# Patient Record
Sex: FEMALE | Race: WHITE | Employment: OTHER | URBAN - METROPOLITAN AREA
[De-identification: names, ages, dates, MRNs, and addresses within clinical notes are randomized per-mention and may not be internally consistent; named-entity substitution may affect disease eponyms.]

---

## 2021-05-29 ENCOUNTER — RECORDS - HEALTHEAST (OUTPATIENT)
Dept: ADMINISTRATIVE | Facility: CLINIC | Age: 86
End: 2021-05-29

## 2021-10-01 ENCOUNTER — TRANSITIONAL CARE UNIT VISIT (OUTPATIENT)
Dept: GERIATRICS | Facility: CLINIC | Age: 86
End: 2021-10-01
Payer: COMMERCIAL

## 2021-10-01 ENCOUNTER — TELEPHONE (OUTPATIENT)
Dept: GERIATRICS | Facility: CLINIC | Age: 86
End: 2021-10-01

## 2021-10-01 VITALS
SYSTOLIC BLOOD PRESSURE: 152 MMHG | HEIGHT: 56 IN | BODY MASS INDEX: 21.15 KG/M2 | HEART RATE: 97 BPM | WEIGHT: 94 LBS | RESPIRATION RATE: 18 BRPM | OXYGEN SATURATION: 94 % | DIASTOLIC BLOOD PRESSURE: 82 MMHG | TEMPERATURE: 97.5 F

## 2021-10-01 DIAGNOSIS — R11.0 NAUSEA: ICD-10-CM

## 2021-10-01 DIAGNOSIS — J43.1 PANLOBULAR EMPHYSEMA (H): ICD-10-CM

## 2021-10-01 DIAGNOSIS — F32.A MILD DEPRESSION: ICD-10-CM

## 2021-10-01 DIAGNOSIS — N18.30 STAGE 3 CHRONIC KIDNEY DISEASE, UNSPECIFIED WHETHER STAGE 3A OR 3B CKD (H): ICD-10-CM

## 2021-10-01 DIAGNOSIS — D64.9 ANEMIA, UNSPECIFIED TYPE: ICD-10-CM

## 2021-10-01 DIAGNOSIS — Z93.2 ILEOSTOMY IN PLACE (H): ICD-10-CM

## 2021-10-01 DIAGNOSIS — K65.1 INTRA-ABDOMINAL ABSCESS (H): Primary | ICD-10-CM

## 2021-10-01 PROBLEM — J96.11 CHRONIC RESPIRATORY FAILURE WITH HYPOXIA (H): Status: ACTIVE | Noted: 2020-01-03

## 2021-10-01 PROCEDURE — 99309 SBSQ NF CARE MODERATE MDM 30: CPT | Performed by: NURSE PRACTITIONER

## 2021-10-01 RX ORDER — SERTRALINE HYDROCHLORIDE 100 MG/1
150 TABLET, FILM COATED ORAL DAILY
COMMUNITY

## 2021-10-01 RX ORDER — MULTIVIT-MIN/IRON/FOLIC ACID/K 18-600-40
2 CAPSULE ORAL DAILY
COMMUNITY

## 2021-10-01 RX ORDER — TRAMADOL HYDROCHLORIDE 50 MG/1
50 TABLET ORAL 4 TIMES DAILY PRN
COMMUNITY
End: 2021-10-08 | Stop reason: ALTCHOICE

## 2021-10-01 RX ORDER — CLOBETASOL PROPIONATE 0.5 MG/G
CREAM TOPICAL 2 TIMES DAILY
COMMUNITY

## 2021-10-01 RX ORDER — LOSARTAN POTASSIUM 50 MG/1
50 TABLET ORAL DAILY
COMMUNITY

## 2021-10-01 RX ORDER — ROPINIROLE 0.5 MG/1
0.5 TABLET, FILM COATED ORAL AT BEDTIME
COMMUNITY

## 2021-10-01 RX ORDER — ONDANSETRON 4 MG/1
4 TABLET, FILM COATED ORAL EVERY 8 HOURS PRN
Qty: 30 TABLET | Refills: 0 | Status: SHIPPED
Start: 2021-10-01

## 2021-10-01 RX ORDER — TRIAMCINOLONE ACETONIDE 1 MG/G
CREAM TOPICAL 2 TIMES DAILY PRN
COMMUNITY

## 2021-10-01 RX ORDER — CEFUROXIME AXETIL 250 MG/1
250 TABLET ORAL 2 TIMES DAILY
COMMUNITY
End: 2021-10-16

## 2021-10-01 RX ORDER — IPRATROPIUM BROMIDE AND ALBUTEROL SULFATE 2.5; .5 MG/3ML; MG/3ML
1 SOLUTION RESPIRATORY (INHALATION) EVERY 6 HOURS PRN
COMMUNITY

## 2021-10-01 RX ORDER — AMLODIPINE BESYLATE 2.5 MG/1
5 TABLET ORAL DAILY
COMMUNITY

## 2021-10-01 RX ORDER — ACETAMINOPHEN 500 MG
1000 TABLET ORAL 3 TIMES DAILY
Qty: 90 TABLET | Refills: 0
Start: 2021-10-01

## 2021-10-01 RX ORDER — LANOLIN ALCOHOL/MO/W.PET/CERES
1000 CREAM (GRAM) TOPICAL DAILY
COMMUNITY

## 2021-10-01 RX ORDER — ALBUTEROL SULFATE 90 UG/1
2 AEROSOL, METERED RESPIRATORY (INHALATION) EVERY 4 HOURS PRN
COMMUNITY

## 2021-10-01 RX ORDER — METRONIDAZOLE 250 MG/1
250 TABLET ORAL 2 TIMES DAILY
COMMUNITY
Start: 2021-10-01 | End: 2021-10-12

## 2021-10-01 ASSESSMENT — MIFFLIN-ST. JEOR: SCORE: 684.38

## 2021-10-01 NOTE — PROGRESS NOTES
[facility] GERIATRIC SERVICES  PRIMARY CARE PROVIDER AND CLINIC:  No primary care provider on file., No primary physician on file.  Chief Complaint   Patient presents with     Hospital F/U      [place] [facility] Record Number:  [id number]  Place of Service where encounter took place:  [facility] ([facility]) [[id number]]    Kelly Gilmore  is a 89 year old  (2/2/1927), admitted to the above facility from  [facility] . Hospital stay 9/20/21 through 9/29/21..   HPI:    Ms. Kelly Gilmore is a 89 y.o. female with a history of emphysema, ulcerative colitis, colectomy, ileostomy since 1990s who presented with abdominal pain, back pain, nausea, also ostomy also occasionally prolapsed, CT with abscess near ostomy, measures 3.7x3.6cm, started on IV Zosyn, the abscess was too small to drain, no indication for surgery, improving with ABX, having new shortness of breath with increased O2 requirements with at, electasis and fluid overload, patient was diuresed and CT PE done to rule out PE, O2 sats stable between 88-92%, discharged to TCU with 2 weeks of cefuroxime and metronidazole. She is to follow-up with Colorectal Surgery for repeat CT scan on 10/13/21.    Labs in hospital Na 127-138, K 3-4.3, H 7.6-8.4    Patient today oriented, lives in own home, fall in TCU on 9/30 no injury, /90, SLUMS 22/30, mild cognitive impairment, no distress, mild pain, also nausea probably from oral ABX, no distress.      CODE STATUS/ADVANCE DIRECTIVES DISCUSSION:  No Order  DNR only  ALLERGIES: No Known Allergies   PAST MEDICAL HISTORY: No past medical history on file.   PAST SURGICAL HISTORY:   has no past surgical history on file.  FAMILY HISTORY: family history is not on file.  SOCIAL HISTORY:     Patient's living condition: lives alone    Post Discharge Medication Reconciliation Status: discharge medications reconciled and changed, per note/orders  Current Outpatient Medications   Medication Sig     acetaminophen  "(TYLENOL) 500 MG tablet Take 2 tablets (1,000 mg) by mouth 3 times daily     albuterol (PROAIR HFA/PROVENTIL HFA/VENTOLIN HFA) 108 (90 Base) MCG/ACT inhaler Inhale 2 puffs into the lungs every 4 hours as needed for shortness of breath / dyspnea or wheezing     amLODIPine (NORVASC) 2.5 MG tablet Take 5 mg by mouth daily     cefuroxime (CEFTIN) 250 MG tablet Take 250 mg by mouth 2 times daily     clobetasol (TEMOVATE) 0.05 % CREA cream Apply topically 2 times daily     cyanocobalamin (VITAMIN B-12) 1000 MCG tablet Take 1,000 mcg by mouth daily     Fluticasone-Umeclidin-Vilanterol (TRELEGY ELLIPTA) 100-62.5-25 MCG/INH oral inhaler Inhale 1 puff into the lungs daily     ipratropium - albuterol 0.5 mg/2.5 mg/3 mL (DUONEB) 0.5-2.5 (3) MG/3ML neb solution Take 1 vial by nebulization every 6 hours as needed for shortness of breath / dyspnea or wheezing     losartan (COZAAR) 50 MG tablet Take 50 mg by mouth daily     metroNIDAZOLE (FLAGYL) 250 MG tablet Take 250 mg by mouth 2 times daily     MULTIPLE VITAMIN PO Take 1 tablet by mouth daily     ondansetron (ZOFRAN) 4 MG tablet Take 1 tablet (4 mg) by mouth every 8 hours as needed for nausea     rOPINIRole (REQUIP) 0.5 MG tablet Take 0.5 mg by mouth At Bedtime     sertraline (ZOLOFT) 100 MG tablet Take 150 mg by mouth daily     traMADol (ULTRAM) 50 MG tablet Take 50 mg by mouth 4 times daily as needed for severe pain     triamcinolone (KENALOG) 0.1 % external cream Apply topically 2 times daily as needed for irritation     Vitamin D, Cholecalciferol, 25 MCG (1000 UT) TABS Take 2 tablets by mouth daily     No current facility-administered medications for this visit.       ROS:  4 point ROS including Respiratory, CV, GI and , other than that noted in the HPI,  is negative    Vitals:  BP (!) 152/82   Pulse 97   Temp 97.5  F (36.4  C)   Resp 18   Ht 1.422 m (4' 8\")   Wt 42.6 kg (94 lb)   SpO2 94%   BMI 21.07 kg/m    Exam:  GENERAL APPEARANCE:  in no distress, appears " healthy, thin  ENT:  Mouth and posterior oropharynx normal, moist mucous membranes  RESP:  lungs clear to auscultation   CV:  no edema, rate-normal  ABDOMEN:  bowel sounds normal  M/S:   Gait and station abnormal transfer assist  SKIN:  Inspection of skin and subcutaneous tissue baseline  NEURO:   Examination of sensation by touch normal  PSYCH:  oriented X 3, memory impaired     Lab/Diagnostic data:  Recent labs in [facility] reviewed by me today.     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)  (R11.0) Nausea  Comment: ANW 9/20-29, no surgical intervention  Plan:  -continue ceftin and flagyl x14 days  -follow up ANW CT scan on 10/13  -PT/OT to eval and treat  -change tramadol to 50mg QID PRN  -add APAP 1000mg TID scheduled  -increase amlodipine from 2.5 to 5mg   -CBC, BMP on 10/4  -start zofran 4mg TID PRN  -plan to follow up early next week RE: status and plan       (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)  Comment: no cough, mild dimness R base  Plan: continue current pulmonary regimen    (N18.30) Stage 3 chronic kidney disease, unspecified whether stage 3a or 3b CKD (H)  Comment: creat in hospital in the 0.8-0.9 range  Plan: dose medication renally as possible  -BMP on 10/4    (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

## 2021-10-02 NOTE — TELEPHONE ENCOUNTER
Resident with new onset of acute moderate hyponatremia. Sodium level 127 today. Per nursing staff, CNP was notified today at 1:00 pm and no orders were given. Resident remains asymptomatic.     Plan:  1. Sodium level tomorrow   2. Call if becomes symptomatic.     SWETA Nieves [facility] Geriatric Services

## 2021-10-04 ENCOUNTER — TRANSITIONAL CARE UNIT VISIT (OUTPATIENT)
Dept: GERIATRICS | Facility: CLINIC | Age: 86
End: 2021-10-04
Payer: COMMERCIAL

## 2021-10-04 VITALS
DIASTOLIC BLOOD PRESSURE: 80 MMHG | HEIGHT: 56 IN | RESPIRATION RATE: 18 BRPM | SYSTOLIC BLOOD PRESSURE: 137 MMHG | OXYGEN SATURATION: 95 % | HEART RATE: 82 BPM | TEMPERATURE: 97.5 F | WEIGHT: 87 LBS | BODY MASS INDEX: 19.57 KG/M2

## 2021-10-04 DIAGNOSIS — E87.1 HYPONATREMIA: ICD-10-CM

## 2021-10-04 DIAGNOSIS — K65.1 INTRA-ABDOMINAL ABSCESS (H): Primary | ICD-10-CM

## 2021-10-04 DIAGNOSIS — E87.6 HYPOKALEMIA: ICD-10-CM

## 2021-10-04 PROCEDURE — 99309 SBSQ NF CARE MODERATE MDM 30: CPT | Performed by: NURSE PRACTITIONER

## 2021-10-04 RX ORDER — POTASSIUM CHLORIDE 1500 MG/1
20 TABLET, EXTENDED RELEASE ORAL DAILY
Qty: 30 TABLET | Refills: 0
Start: 2021-10-04

## 2021-10-04 RX ORDER — OXYCODONE HYDROCHLORIDE 5 MG/1
2.5 TABLET ORAL EVERY 4 HOURS PRN
Qty: 12 TABLET | Refills: 0
Start: 2021-10-04 | End: 2021-10-08

## 2021-10-04 ASSESSMENT — MIFFLIN-ST. JEOR: SCORE: 652.63

## 2021-10-04 NOTE — PROGRESS NOTES
"[facility] GERIATRIC SERVICES    Chief Complaint   Patient presents with     Nursing Home Acute     HPI:  Kelly Gilmore is a 89 year old  (2/2/1927), who is being seen today for an episodic care visit at: [facility] ([facility]) [[id number]]. Today's concern is:   1. Intra-abdominal abscess (H)    2. Hypokalemia    3. Hyponatremia      Patient seen today for follow up, resting in bed, weak, deconditioned, NA's trending in the 126-127 range, K today 3.3, having some abdominal discomfort, states the tramadol dose not work and requesting oxycodone that she had in hospital, ostomy appliance in place, output WNL, SLUMS 22/30, no distress.    Allergies, and PMH/PSH reviewed in EPIC today.  REVIEW OF SYSTEMS:  4 point ROS including Respiratory, CV, GI and , other than that noted in the HPI,  is negative    Objective:   /80   Pulse 82   Temp 97.5  F (36.4  C)   Resp 18   Ht 1.422 m (4' 8\")   Wt 39.5 kg (87 lb)   SpO2 95%   BMI 19.51 kg/m    GENERAL APPEARANCE:  in no distress, thin  ENT:  Mouth and posterior oropharynx normal, moist mucous membranes  RESP:  lungs clear to auscultation   CV:  no edema  ABDOMEN:  bowel sounds normal  M/S:   Gait and station abnormal transfer assist  SKIN:  Inspection of skin and subcutaneous tissue baseline  NEURO:   Examination of sensation by touch normal  PSYCH:  oriented X 3, memory impaired     [facility]. Please refer to them using [facility]/Care Everywhere.    Assessment/Plan:  (K65.1) Intra-abdominal abscess (H)  (primary encounter diagnosis)  Comment: mild gut pain, no distress, VS WNL  Plan: add oxycodone 2.5mg Q4h PRN  -continue tramadol PRN for now  -plan to discontinue tramadol if opioid is effective  -follow up CT on 10/13    (E87.6) Hypokalemia  Comment: K today 3.3  Plan: start K 20mEq  -BMP on 10/11    (E87.1) Hyponatremia  Comment: Na today 126, asymptomatic  Plan: recheck Na on 10/11  -NaCl tabs 1g BID      Electronically signed by: " Laurent Mcginnis, SWETA CNP

## 2021-10-04 NOTE — LETTER
"    10/4/2021        RE: Kelly Gilmore  [address]  [facility] [address]        [facility]    Chief Complaint   Patient presents with     Nursing Home Acute     HPI:  Kelly Gilmore is a 89 year old  (2/2/1927), who is being seen today for an episodic care visit at: [facility] ([facility]) [[id number]]. Today's concern is:   1. Intra-abdominal abscess (H)    2. Hypokalemia    3. Hyponatremia      Patient seen today for follow up, resting in bed, weak, deconditioned, NA's trending in the 126-127 range, K today 3.3, having some abdominal discomfort, states the tramadol dose not work and requesting oxycodone that she had in hospital, ostomy appliance in place, output WNL, SLUMS 22/30, no distress.    Allergies, and PMH/PSH reviewed in EPIC today.  REVIEW OF SYSTEMS:  4 point ROS including Respiratory, CV, GI and , other than that noted in the HPI,  is negative    Objective:   /80   Pulse 82   Temp 97.5  F (36.4  C)   Resp 18   Ht 1.422 m (4' 8\")   Wt 39.5 kg (87 lb)   SpO2 95%   BMI 19.51 kg/m    GENERAL APPEARANCE:  in no distress, thin  ENT:  Mouth and posterior oropharynx normal, moist mucous membranes  RESP:  lungs clear to auscultation   CV:  no edema  ABDOMEN:  bowel sounds normal  M/S:   Gait and station abnormal transfer assist  SKIN:  Inspection of skin and subcutaneous tissue baseline  NEURO:   Examination of sensation by touch normal  PSYCH:  oriented X 3, memory impaired     Labs [facility] SNF [facility]. Please refer to them using [facility]/Care Everywhere.    Assessment/Plan:  (K65.1) Intra-abdominal abscess (H)  (primary encounter diagnosis)  Comment: mild gut pain, no distress, VS WNL  Plan: add oxycodone 2.5mg Q4h PRN  -continue tramadol PRN for now  -plan to discontinue tramadol if opioid is effective  -follow up CT on 10/13    (E87.6) Hypokalemia  Comment: K today 3.3  Plan: start K 20mEq  -BMP on 10/11    (E87.1) Hyponatremia  Comment: Na today 126, " asymptomatic  Plan: recheck Na on 10/11  -NaCl tabs 1g BID      Electronically signed by: SWETA Yousif CNP             Sincerely,        SWETA Yousif CNP

## 2021-10-07 ENCOUNTER — NURSING HOME VISIT (OUTPATIENT)
Dept: GERIATRICS | Facility: CLINIC | Age: 86
End: 2021-10-07
Payer: COMMERCIAL

## 2021-10-07 DIAGNOSIS — R11.0 NAUSEA: ICD-10-CM

## 2021-10-07 DIAGNOSIS — N18.30 STAGE 3 CHRONIC KIDNEY DISEASE, UNSPECIFIED WHETHER STAGE 3A OR 3B CKD (H): ICD-10-CM

## 2021-10-07 DIAGNOSIS — D64.9 ANEMIA, UNSPECIFIED TYPE: ICD-10-CM

## 2021-10-07 DIAGNOSIS — J43.1 PANLOBULAR EMPHYSEMA (H): ICD-10-CM

## 2021-10-07 DIAGNOSIS — K65.1 INTRA-ABDOMINAL ABSCESS (H): Primary | ICD-10-CM

## 2021-10-07 DIAGNOSIS — E87.1 HYPONATREMIA: ICD-10-CM

## 2021-10-07 PROCEDURE — 99304 1ST NF CARE SF/LOW MDM 25: CPT | Performed by: INTERNAL MEDICINE

## 2021-10-08 ENCOUNTER — TRANSITIONAL CARE UNIT VISIT (OUTPATIENT)
Dept: GERIATRICS | Facility: CLINIC | Age: 86
End: 2021-10-08
Payer: COMMERCIAL

## 2021-10-08 VITALS
RESPIRATION RATE: 18 BRPM | SYSTOLIC BLOOD PRESSURE: 102 MMHG | DIASTOLIC BLOOD PRESSURE: 65 MMHG | HEIGHT: 56 IN | TEMPERATURE: 97.5 F | WEIGHT: 85 LBS | BODY MASS INDEX: 19.12 KG/M2 | OXYGEN SATURATION: 97 % | HEART RATE: 94 BPM

## 2021-10-08 DIAGNOSIS — E44.0 MODERATE PROTEIN-CALORIE MALNUTRITION (H): Primary | ICD-10-CM

## 2021-10-08 DIAGNOSIS — R10.84 ABDOMINAL PAIN, GENERALIZED: ICD-10-CM

## 2021-10-08 DIAGNOSIS — K65.1 INTRA-ABDOMINAL ABSCESS (H): ICD-10-CM

## 2021-10-08 PROCEDURE — 99309 SBSQ NF CARE MODERATE MDM 30: CPT | Performed by: NURSE PRACTITIONER

## 2021-10-08 RX ORDER — OXYCODONE HYDROCHLORIDE 5 MG/1
5 TABLET ORAL EVERY 4 HOURS PRN
Qty: 30 TABLET | Refills: 0
Start: 2021-10-08

## 2021-10-08 ASSESSMENT — MIFFLIN-ST. JEOR: SCORE: 643.56

## 2021-10-08 NOTE — PROGRESS NOTES
"[facility] GERIATRIC SERVICES    Chief Complaint   Patient presents with     Nursing Home Acute     HPI:  Kelly Gilmore is a 89 year old  (2/2/1927), who is being seen today for an episodic care visit at: [facility] ([facility]) [[id number]]. Today's concern is:   1. Moderate protein-calorie malnutrition (H)    2. Intra-abdominal abscess (H)    3. Abdominal pain, generalized      Patient seen today with therapies for follow up, BMI 19.06, limited appetite, moderate mid-abdominal pain non-acute thought to be from abscess, elevated BP from pain, labs on 10/5 Na 127, K 3.3, skin intact, no distress.    Allergies, and PMH/PSH reviewed in EPIC today.  REVIEW OF SYSTEMS:  4 point ROS including Respiratory, CV, GI and , other than that noted in the HPI,  is negative    Objective:   /65   Pulse 94   Temp 97.5  F (36.4  C)   Resp 18   Ht 1.422 m (4' 8\")   Wt 38.6 kg (85 lb)   SpO2 97%   BMI 19.06 kg/m    GENERAL APPEARANCE:  in no distress, thin  ENT:  Mouth and posterior oropharynx normal, moist mucous membranes  RESP:  lungs clear to auscultation   CV:  no edema, rate-normal  ABDOMEN:  bowel sounds normal  M/S:   Gait and station abnormal transfer assist  SKIN:  Inspection of skin and subcutaneous tissue baseline  NEURO:   Examination of sensation by touch normal  PSYCH:  oriented X 3    Labs done in SNF are in [facility]. Please refer to them using [facility]/Care Everywhere.    Assessment/Plan:  (E44.0) Moderate protein-calorie malnutrition (H)  (primary encounter diagnosis)  Comment: BMI 19.06  Plan: staff to encourage intake  -ordered dietary consult, to follow up, make recommendations    (K65.1) Intra-abdominal abscess (H)  (R10.84) Abdominal pain, generalized  Comment: abscess no surgical intervention  Plan:   -continue ceftin and flagyl x14 days through 10/13+-  -discontinue tramadol, not effective  -discontinue oxycodone 2.5mg  -start oxycodone 5mg Q4h PRN  -BMP on 10/11        Electronically signed " by: SWETA Yousif CNP

## 2021-10-09 NOTE — PROGRESS NOTES
[facility] GERIATRIC SERVICES  PRIMARY CARE PROVIDER AND CLINIC:  No primary care provider on file., No primary physician on file.      Pt was seen by Dr Morrell at the [facility] on 10/7/21 for an initial TCU visit    Kelly Gilmore  is a 89 year old  (2/2/1927), admitted to the above facility from  [facility] . Hospital stay 9/20/21 through 9/29/21..       Hospital course was reviewed by me, is as per the hospital discharge summary and NP note.    Pt  is a 89 y.o. female with a history of emphysema, ulcerative colitis, s/p colectomy, ileostomy  1990s who was hospitalized for the evaluation of R sided abd pain and nausea, was found have an abscess near ostomy.   Pt was seen by colorectal surgery who felt the abscess was too small to drain, recommended IV antibiotics.  Course complicated by SOB, felt to represent atelectasis.  CT chest revealed no evidence of PE, + small B pleural effusions. She was discharged on cefuroxime and metronidazole for 2 week course, with plan for colorectal surgery f/u and repeat CT scan on 10/13/21.  Hgb around 8 during hospitalization, MCV nl    Pt states R sided abd pain is gradually improving; oxycodone has been started for abd pain.   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  HTN  CKD stage 3     PAST SURGICAL HISTORY:    Colectomy  Ileostomy    FAMILY HISTORY: Reviewed by me, is not contributory  SOCIAL HISTORY:   denies cigarette or alcohol use  Patient's living condition: lives alone      Current Outpatient Medications   Medication Sig     acetaminophen (TYLENOL) 500 MG tablet Take 2 tablets (1,000 mg)  by mouth 3 times daily     albuterol (PROAIR HFA/PROVENTIL HFA/VENTOLIN HFA) 108 (90 Base) MCG/ACT inhaler Inhale 2 puffs into the lungs every 4 hours as needed for shortness of breath / dyspnea or wheezing     amLODIPine (NORVASC) 2.5 MG tablet Take 5 mg by mouth daily     cefuroxime (CEFTIN) 250 MG tablet Take 250 mg by mouth 2 times daily     clobetasol (TEMOVATE) 0.05 % CREA cream Apply topically 2 times daily     cyanocobalamin (VITAMIN B-12) 1000 MCG tablet Take 1,000 mcg by mouth daily     Fluticasone-Umeclidin-Vilanterol (TRELEGY ELLIPTA) 100-62.5-25 MCG/INH oral inhaler Inhale 1 puff into the lungs daily     ipratropium - albuterol 0.5 mg/2.5 mg/3 mL (DUONEB) 0.5-2.5 (3) MG/3ML neb solution Take 1 vial by nebulization every 6 hours as needed for shortness of breath / dyspnea or wheezing     losartan (COZAAR) 50 MG tablet Take 50 mg by mouth daily     metroNIDAZOLE (FLAGYL) 250 MG tablet Take 250 mg by mouth 2 times daily     MULTIPLE VITAMIN PO Take 1 tablet by mouth daily     ondansetron (ZOFRAN) 4 MG tablet Take 1 tablet (4 mg) by mouth every 8 hours as needed for nausea     oxyCODONE (ROXICODONE) 5 MG tablet Take 1 tablet (5 mg) by mouth every 4 hours as needed for pain     potassium chloride ER (K-TAB) 20 MEQ CR tablet Take 1 tablet (20 mEq) by mouth daily     rOPINIRole (REQUIP) 0.5 MG tablet Take 0.5 mg by mouth At Bedtime     sertraline (ZOLOFT) 100 MG tablet Take 150 mg by mouth daily     triamcinolone (KENALOG) 0.1 % external cream Apply topically 2 times daily as needed for irritation     Vitamin D, Cholecalciferol, 25 MCG (1000 UT) TABS Take 2 tablets by mouth daily     No current facility-administered medications for this visit.       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 Less   Plan monitor BMP, if continues to decline may need trial of IV NS, vs salt tabs and FR  Hopefully will resolve with improvement in infection and nausea      (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)  02 dependent  B pleural effusions per CT in hospital  Plan monitor resp status, CXR if worsening hypoxia    Anemia  (D64.9) Anemia, unspecified type, suspect related to infection  Plan monitor Hgb      Osvaldo Morrell MD

## 2021-10-11 ENCOUNTER — TRANSITIONAL CARE UNIT VISIT (OUTPATIENT)
Dept: GERIATRICS | Facility: CLINIC | Age: 86
End: 2021-10-11
Payer: COMMERCIAL

## 2021-10-11 ENCOUNTER — TRANSFERRED RECORDS (OUTPATIENT)
Dept: HEALTH INFORMATION MANAGEMENT | Facility: CLINIC | Age: 86
End: 2021-10-11

## 2021-10-11 VITALS
HEIGHT: 56 IN | HEART RATE: 98 BPM | RESPIRATION RATE: 20 BRPM | OXYGEN SATURATION: 96 % | TEMPERATURE: 97.5 F | BODY MASS INDEX: 19.14 KG/M2 | SYSTOLIC BLOOD PRESSURE: 151 MMHG | WEIGHT: 85.1 LBS | DIASTOLIC BLOOD PRESSURE: 84 MMHG

## 2021-10-11 DIAGNOSIS — Z93.2 ILEOSTOMY IN PLACE (H): Primary | ICD-10-CM

## 2021-10-11 DIAGNOSIS — K65.1 INTRA-ABDOMINAL ABSCESS (H): ICD-10-CM

## 2021-10-11 DIAGNOSIS — R10.84 ABDOMINAL PAIN, GENERALIZED: ICD-10-CM

## 2021-10-11 LAB
ANION GAP SERPL CALC-SCNC: 8 MMOL/L (ref 7–16)
BUN SERPL-MCNC: 16 MG/DL (ref 7–26)
CALCIUM (EXTERNAL): 9.4 MG/DL (ref 8.4–10.4)
CHLORIDE (EXTERNAL): 106 MMOL/L (ref 98–109)
CO2 (EXTERNAL): 23 MMOL/L (ref 20–29)
CREATININE (EXTERNAL): 0.59 MG/DL (ref 0.55–1.02)
GFR ESTIMATED (EXTERNAL): >60 ML/MIN/1.73M2
GLUCOSE (EXTERNAL): 100 MG/DL (ref 70–100)
POTASSIUM (EXTERNAL): 4.5 MMOL/L (ref 3.5–5.1)
SODIUM (EXTERNAL): 136 MMOL/L (ref 136–145)

## 2021-10-11 PROCEDURE — 99309 SBSQ NF CARE MODERATE MDM 30: CPT | Performed by: NURSE PRACTITIONER

## 2021-10-11 ASSESSMENT — MIFFLIN-ST. JEOR: SCORE: 644.01

## 2021-10-11 NOTE — LETTER
"    10/11/2021        RE: Kelly Gilmore  [address]  [facility] [address]        [facility]    Chief Complaint   Patient presents with     Nursing Home Acute     HPI:  Kelly Gilmore is a 89 year old  (2/2/1927), who is being seen today for an episodic care visit at: [facility] ([facility]) [[id number]]. Today's concern is:   1. 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 Intra-abdominal abscess (H)    3. Abdominal pain, generalized      Patient seen for follow up, SLUMS 22/30, moderate historian, BMI 19.08, dislikes facility food, on oral ABX for abscess, states having some abdominal discomfort in RUQ area, BS+, abdominal palpation benign with no increased pain, ostomy appliance intact with normal output, labs on 10/5 generally WNL, no distress.    Allergies, and PMH/PSH reviewed in EPIC today.  REVIEW OF SYSTEMS:  4 point ROS including Respiratory, CV, GI and , other than that noted in the HPI,  is negative    Objective:   BP (!) 151/84   Pulse 98   Temp 97.5  F (36.4  C)   Resp 20   Ht 1.422 m (4' 8\")   Wt 38.6 kg (85 lb 1.6 oz)   SpO2 96%   BMI 19.08 kg/m    GENERAL APPEARANCE:  in no distress, thin  ENT:  Mouth and posterior oropharynx normal, moist mucous membranes  RESP:  lungs clear to auscultation , no respiratory distress  CV:  no edema  ABDOMEN:  bowel sounds normal  M/S:   Gait and station abnormal transfer assist  SKIN:  Inspection of skin and subcutaneous tissue baseline  NEURO:   Examination of sensation by touch normal  PSYCH:  oriented X 3    Labs done in SNF are in [place] [facility]. Please refer to them using [facility]/Care Everywhere.    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)  (primary encounter diagnosis)  (K65.1) Intra-abdominal abscess (H)  Comment: ileostomy x30y, new abscess finding- no surgical intervention  Plan:   -continue flagyl and ceftin through 10/13  -AXR 2-view to rule out gut issue  -APAP TID scheduled, oxycodone 5mg Q4h  -dietary consult  -PT/OT working with "   -probable discharge soon to AL facility versus return home alone    (R10.84) Abdominal pain, generalized  Comment: mild complaint in RUQ  Plan: AXR ordered  -APAP/oxy for pain  -plan to follow up later this week RE; additional lab work        Electronically signed by: SWETA Yousif CNP             Sincerely,        SWETA Yousif CNP

## 2021-10-11 NOTE — PROGRESS NOTES
"[facility] GERIATRIC SERVICES    Chief Complaint   Patient presents with     Nursing Home Acute     HPI:  Kelly Gilmore is a 89 year old  (2/2/1927), who is being seen today for an episodic care visit at: [facility] ([facility]) [[id number]]. Today's concern is:   1. 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 Intra-abdominal abscess (H)    3. Abdominal pain, generalized      Patient seen for follow up, SLUMS 22/30, moderate historian, BMI 19.08, dislikes facility food, on oral ABX for abscess, states having some abdominal discomfort in RUQ area, BS+, abdominal palpation benign with no increased pain, ostomy appliance intact with normal output, labs on 10/5 generally WNL, no distress.    Allergies, and PMH/PSH reviewed in EPIC today.  REVIEW OF SYSTEMS:  4 point ROS including Respiratory, CV, GI and , other than that noted in the HPI,  is negative    Objective:   BP (!) 151/84   Pulse 98   Temp 97.5  F (36.4  C)   Resp 20   Ht 1.422 m (4' 8\")   Wt 38.6 kg (85 lb 1.6 oz)   SpO2 96%   BMI 19.08 kg/m    GENERAL APPEARANCE:  in no distress, thin  ENT:  Mouth and posterior oropharynx normal, moist mucous membranes  RESP:  lungs clear to auscultation , no respiratory distress  CV:  no edema  ABDOMEN:  bowel sounds normal  M/S:   Gait and station abnormal transfer assist  SKIN:  Inspection of skin and subcutaneous tissue baseline  NEURO:   Examination of sensation by touch normal  PSYCH:  oriented X 3    Labs done in SNF [facility] in [facility]. Please refer to them using [place]/Care Everywhere.    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

## 2021-10-13 ENCOUNTER — TRANSITIONAL CARE UNIT VISIT (OUTPATIENT)
Dept: GERIATRICS | Facility: CLINIC | Age: 86
End: 2021-10-13
Payer: COMMERCIAL

## 2021-10-13 VITALS
HEIGHT: 56 IN | BODY MASS INDEX: 18 KG/M2 | SYSTOLIC BLOOD PRESSURE: 147 MMHG | HEART RATE: 100 BPM | OXYGEN SATURATION: 95 % | RESPIRATION RATE: 16 BRPM | WEIGHT: 80 LBS | TEMPERATURE: 97.5 F | DIASTOLIC BLOOD PRESSURE: 73 MMHG

## 2021-10-13 DIAGNOSIS — R11.0 NAUSEA: ICD-10-CM

## 2021-10-13 DIAGNOSIS — E44.0 MODERATE PROTEIN-CALORIE MALNUTRITION (H): Primary | ICD-10-CM

## 2021-10-13 DIAGNOSIS — J38.7 MUCUS POOLING IN LARYNX: ICD-10-CM

## 2021-10-13 DIAGNOSIS — K21.9 GASTROESOPHAGEAL REFLUX DISEASE WITHOUT ESOPHAGITIS: ICD-10-CM

## 2021-10-13 PROBLEM — E46 PROTEIN-CALORIE MALNUTRITION (H): Status: ACTIVE | Noted: 2021-10-13

## 2021-10-13 PROCEDURE — 99309 SBSQ NF CARE MODERATE MDM 30: CPT | Performed by: NURSE PRACTITIONER

## 2021-10-13 RX ORDER — GUAIFENESIN 600 MG/1
1200 TABLET, EXTENDED RELEASE ORAL DAILY
Qty: 30 TABLET | Refills: 0
Start: 2021-10-13

## 2021-10-13 ASSESSMENT — MIFFLIN-ST. JEOR: SCORE: 620.88

## 2021-10-13 NOTE — PROGRESS NOTES
"[facility] GERIATRIC SERVICES    Chief Complaint   Patient presents with     Nursing Home Acute     HPI:  Kelly Gilmore is a 89 year old  (2/2/1927), who is being seen today for an episodic care visit at: [facility] ([facility]) [[id number]]. Today's concern is:   1. Moderate protein-calorie malnutrition (H)    2. Gastroesophageal reflux disease without esophagitis    3. Mucus pooling in larynx    4. Nausea      Patient seen today on request, also met with ST post evaluation, post abdominal abscess with colostomy in place, SLUMs 22/30, complaint of having phlegm in throat, previously taking mucinex at home but med did not transfer to U, per ST having certain level of gastric reflux along with intermittent nausea, thin habitus with BMI 17.94, no distress.    Allergies, and PMH/PSH reviewed in EPIC today.  REVIEW OF SYSTEMS:  4 point ROS including Respiratory, CV, GI and , other than that noted in the HPI,  is negative    Objective:   BP (!) 147/73   Pulse 100   Temp 97.5  F (36.4  C)   Resp 16   Ht 1.422 m (4' 8\")   Wt 36.3 kg (80 lb)   SpO2 95%   BMI 17.94 kg/m    GENERAL APPEARANCE:  Alert, in no distress  ENT:  Mouth and posterior oropharynx normal, moist mucous membranes  RESP:  lungs clear to auscultation   CV:  no edema  ABDOMEN:  bowel sounds normal  M/S:   Gait and station abnormal transfer assist  SKIN:  Inspection of skin and subcutaneous tissue baseline  NEURO:   Examination of sensation by touch normal  PSYCH:  oriented X 3, memory impaired     Labs done in SNF are in [facility]. Please refer to them using [employer]/Care Everywhere.    Assessment/Plan:  (E44.0) Moderate protein-calorie malnutrition (H)  (primary encounter diagnosis)  (K21.9) Gastroesophageal reflux disease without esophagitis  (J38.7) Mucus pooling in larynx  (R11.0) Nausea  Comment: BMI 17.94, very thin, picky eater, dislikes facility food, mucus in throat and certain level of reflux and nausea  Plan:   -restart mucinex 600mg " Qam  -PT/OT/ST to continue working with  -staff to encourage intake  -dietary to follow, may need diet supplementation  -plan to start omeprazole and/or zofran later this week after second ST eval      Electronically signed by: SWETA Yousif CNP

## 2021-10-14 ENCOUNTER — TRANSFERRED RECORDS (OUTPATIENT)
Dept: HEALTH INFORMATION MANAGEMENT | Facility: CLINIC | Age: 86
End: 2021-10-14

## 2021-10-14 PROBLEM — L98.9 LEG LESION: Status: ACTIVE | Noted: 2020-09-12

## 2021-10-14 PROBLEM — I70.0 ATHEROSCLEROSIS OF AORTA (H): Status: ACTIVE | Noted: 2020-09-12

## 2021-10-14 PROBLEM — M54.16 LUMBAR RADICULOPATHY: Status: ACTIVE | Noted: 2018-07-12

## 2021-10-14 PROBLEM — Z71.89 ACP (ADVANCE CARE PLANNING): Status: ACTIVE | Noted: 2020-09-12

## 2021-10-14 LAB
ANION GAP SERPL CALC-SCNC: 11 MMOL/L (ref 7–16)
BUN SERPL-MCNC: 18 MG/DL (ref 7–26)
CALCIUM (EXTERNAL): 9.2 MG/DL (ref 8.4–10.4)
CHLORIDE (EXTERNAL): 107 MMOL/L (ref 98–109)
CO2 (EXTERNAL): 22 MMOL/L (ref 20–29)
CREATININE (EXTERNAL): 0.78 MG/DL (ref 0.55–1.02)
ERYTHROCYTE [DISTWIDTH] IN BLOOD BY AUTOMATED COUNT: 20.8 % (ref 11.9–15.5)
GFR ESTIMATED (EXTERNAL): >60 ML/MIN/1.73M2
GLUCOSE (EXTERNAL): 166 MG/DL (ref 70–100)
HEMATOCRIT (EXTERNAL): 30.8 % (ref 34.9–44.5)
HEMOGLOBIN (EXTERNAL): 9.6 G/DL (ref 12–15.5)
MCH RBC QN AUTO: 28.2 PG (ref 27.6–33.3)
MCHC RBC AUTO-ENTMCNC: 31.2 G/DL (ref 31.5–35.2)
MCV RBC AUTO: 90.3 FL (ref 80–100)
PLATELET COUNT (EXTERNAL): 313 X10(9)/L (ref 150–450)
POTASSIUM (EXTERNAL): 4 MMOL/L (ref 3.5–5.1)
RBC # BLD AUTO: 3.41 X10(12)/L (ref 3.9–5.03)
SODIUM (EXTERNAL): 140 MMOL/L (ref 136–145)
WBC COUNT (EXTERNAL): 7.5 X10(9)/L (ref 3.5–10.5)

## 2021-10-14 NOTE — PROGRESS NOTES
"[facility] GERIATRIC SERVICES  [place] Medical Record Number: [id number]  Chief Complaint   Patient presents with     RECHECK     HPI: Kelly Gilmore is a 89 year old (2/2/1927), who is being seen today for an episodic care visit at: [facility] ([facility]) [[id number]]. Today's concern is:   1. Moderate protein-calorie malnutrition (H)    2. Mucus pooling in larynx    3. Gastroesophageal reflux disease without esophagitis    4. Intra-abdominal abscess (H)      Patient seen today and ST consulted, BMI 18.38, states having gained a pound or tow since arriving to [facility], dislikes the food, having certain level of mucus in throat and mild s/s GERD that may be affecting intake, had CT on 10/13 with resolution of abscess, afebrile, recent labs were generally WNL.    Allergies and PMH/PSH reviewed in EPIC today.    REVIEW OF SYSTEMS:  4 point ROS including Respiratory, CV, GI and , other than that noted in the HPI,  is negative    Objective:   /81   Pulse 97   Temp 97.7  F (36.5  C)   Resp 18   Ht 1.422 m (4' 8\")   Wt 37.2 kg (82 lb)   SpO2 95%   BMI 18.38 kg/m    Exam:  GENERAL APPEARANCE:  in no distress, thin  ENT:  Mouth and posterior oropharynx normal, moist mucous membranes  RESP:  lungs clear to auscultation   CV:  no edema, rate-normal  ABDOMEN:  bowel sounds normal  M/S:   Gait and station abnormal using walker  SKIN:  Inspection of skin and subcutaneous tissue baseline  NEURO:   Examination of sensation by touch normal  PSYCH:  oriented X 3, memory impaired     Labs:   Labs done in SNF are in [facility]. Please refer to them using [employer]/Care Everywhere.    Assessment/Plan:  (E44.0) Moderate protein-calorie malnutrition (H)  (primary encounter diagnosis)  Comment: BMI 18.38, mild weight gain  Plan: encourage intake as possible  -weights daily  -dietary to follow    (J38.7) Mucus pooling in larynx  (K21.9) Gastroesophageal reflux disease without esophagitis  Comment: certain esophageal issues, " difficult to ascertain  Plan: ST to continue  -continue mucinex Qam  -start omeprazole 20mg BID    (K65.1) Intra-abdominal abscess (H)  Comment: CT 10/13 showed resolution  Plan: discontinue ceftin and flagyl  -follow up GI PRN        Electronically signed by: SWETA Yousif CNP

## 2021-10-15 ENCOUNTER — TRANSITIONAL CARE UNIT VISIT (OUTPATIENT)
Dept: GERIATRICS | Facility: CLINIC | Age: 86
End: 2021-10-15
Payer: COMMERCIAL

## 2021-10-15 VITALS
HEART RATE: 97 BPM | HEIGHT: 56 IN | RESPIRATION RATE: 18 BRPM | BODY MASS INDEX: 18.44 KG/M2 | SYSTOLIC BLOOD PRESSURE: 126 MMHG | DIASTOLIC BLOOD PRESSURE: 81 MMHG | OXYGEN SATURATION: 95 % | TEMPERATURE: 97.7 F | WEIGHT: 82 LBS

## 2021-10-15 DIAGNOSIS — E44.0 MODERATE PROTEIN-CALORIE MALNUTRITION (H): Primary | ICD-10-CM

## 2021-10-15 DIAGNOSIS — J38.7 MUCUS POOLING IN LARYNX: ICD-10-CM

## 2021-10-15 DIAGNOSIS — K65.1 INTRA-ABDOMINAL ABSCESS (H): ICD-10-CM

## 2021-10-15 DIAGNOSIS — K21.9 GASTROESOPHAGEAL REFLUX DISEASE WITHOUT ESOPHAGITIS: ICD-10-CM

## 2021-10-15 PROCEDURE — 99309 SBSQ NF CARE MODERATE MDM 30: CPT | Performed by: NURSE PRACTITIONER

## 2021-10-15 ASSESSMENT — MIFFLIN-ST. JEOR: SCORE: 629.95

## 2021-10-15 NOTE — LETTER
"    10/15/2021        RE: Kelly Gilmore  [address]  [facility] [address]        [facility] SERVICES  [place] Medical Record Number: [id number]  Chief Complaint   Patient presents with     RECHECK     HPI: Kelly Gilmore is a 89 year old (2/2/1927), who is being seen today for an episodic care visit at: [facility] ([facility]) [[id number]]. Today's concern is:   1. Moderate protein-calorie malnutrition (H)    2. Mucus pooling in larynx    3. Gastroesophageal reflux disease without esophagitis    4. Intra-abdominal abscess (H)      Patient seen today and ST consulted, BMI 18.38, states having gained a pound or tow since arriving to [facility], dislikes the food, having certain level of mucus in throat and mild s/s GERD that may be affecting intake, had CT on 10/13 with resolution of abscess, afebrile, recent labs were generally WNL.    Allergies and PMH/PSH reviewed in EPIC today.    REVIEW OF SYSTEMS:  4 point ROS including Respiratory, CV, GI and , other than that noted in the HPI,  is negative    Objective:   /81   Pulse 97   Temp 97.7  F (36.5  C)   Resp 18   Ht 1.422 m (4' 8\")   Wt 37.2 kg (82 lb)   SpO2 95%   BMI 18.38 kg/m    Exam:  GENERAL APPEARANCE:  in no distress, thin  ENT:  Mouth and posterior oropharynx normal, moist mucous membranes  RESP:  lungs clear to auscultation   CV:  no edema, rate-normal  ABDOMEN:  bowel sounds normal  M/S:   Gait and station abnormal using walker  SKIN:  Inspection of skin and subcutaneous tissue baseline  NEURO:   Examination of sensation by touch normal  PSYCH:  oriented X 3, memory impaired     Labs:   Labs done in SNF are in [facility]. Please refer to them using [employer]/Care Everywhere.    Assessment/Plan:  (E44.0) Moderate protein-calorie malnutrition (H)  (primary encounter diagnosis)  Comment: BMI 18.38, mild weight gain  Plan: encourage intake as possible  -weights daily  -dietary to follow    (J38.7) Mucus pooling in larynx  (K21.9) " Gastroesophageal reflux disease without esophagitis  Comment: certain esophageal issues, difficult to ascertain  Plan: ST to continue  -continue mucinex Qam  -start omeprazole 20mg BID    (K65.1) Intra-abdominal abscess (H)  Comment: CT 10/13 showed resolution  Plan: discontinue ceftin and flagyl  -follow up GI PRN        Electronically signed by: SWETA Yousif CNP        Sincerely,        SWETA Yousif CNP

## 2021-10-18 ENCOUNTER — TRANSITIONAL CARE UNIT VISIT (OUTPATIENT)
Dept: GERIATRICS | Facility: CLINIC | Age: 86
End: 2021-10-18
Payer: COMMERCIAL

## 2021-10-18 VITALS
SYSTOLIC BLOOD PRESSURE: 132 MMHG | HEIGHT: 56 IN | TEMPERATURE: 97.2 F | RESPIRATION RATE: 16 BRPM | OXYGEN SATURATION: 96 % | HEART RATE: 89 BPM | WEIGHT: 85 LBS | BODY MASS INDEX: 19.12 KG/M2 | DIASTOLIC BLOOD PRESSURE: 72 MMHG

## 2021-10-18 DIAGNOSIS — Z93.2 ILEOSTOMY IN PLACE (H): Primary | ICD-10-CM

## 2021-10-18 DIAGNOSIS — M62.81 GENERALIZED MUSCLE WEAKNESS: ICD-10-CM

## 2021-10-18 DIAGNOSIS — J38.7 MUCUS POOLING IN LARYNX: ICD-10-CM

## 2021-10-18 DIAGNOSIS — K65.1 INTRA-ABDOMINAL ABSCESS (H): ICD-10-CM

## 2021-10-18 PROCEDURE — 99309 SBSQ NF CARE MODERATE MDM 30: CPT | Performed by: NURSE PRACTITIONER

## 2021-10-18 ASSESSMENT — MIFFLIN-ST. JEOR: SCORE: 643.56

## 2021-10-18 NOTE — LETTER
"    10/18/2021        RE: Kelly Gilmore  [address] [place] [address] S  [facility] [id number]        [facility] SERVICES  [place] Medical Record Number: [id number]  Chief Complaint   Patient presents with     RECHECK     HPI: Kelly Gilmore is a 89 year old (2/2/1927), who is being seen today for an episodic care visit at: [facility] ([facility]) [[id number]]. Today's concern is:   1. 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 Intra-abdominal abscess (H)    3. Mucus pooling in larynx      Patient seen today for follow up, labs on 10/14 generally WNL, ostomy appliance intact with normal output, CT scan abdomen on 10/13 showed resolved abscess, denies gut pain, still having certain level of dysphagia with mucus in throat, able to clear, started on mucinex and omeprazole with mild improvement, no distress.    Allergies and PMH/PSH reviewed in EPIC today.    REVIEW OF SYSTEMS:  4 point ROS including Respiratory, CV, GI and , other than that noted in the HPI,  is negative    Objective:   /72   Pulse 89   Temp 97.2  F (36.2  C)   Resp 16   Ht 1.422 m (4' 8\")   Wt 38.6 kg (85 lb)   SpO2 96%   BMI 19.06 kg/m    Exam:  GENERAL APPEARANCE:  in no distress, thin  ENT:  Mouth and posterior oropharynx normal, moist mucous membranes  RESP:  lungs clear to auscultation   CV:  no edema  ABDOMEN:  bowel sounds normal  M/S:   Gait and station abnormal transfer assist  SKIN:  Inspection of skin and subcutaneous tissue baseline  NEURO:   Examination of sensation by touch normal  PSYCH:  oriented X 3, memory impaired     Labs:   Labs done in SNF are in [facility]. Please refer to them using EPIC/Care Everywhere.    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

## 2021-10-18 NOTE — PROGRESS NOTES
"[facility] GERIATRIC SERVICES  [place] Medical Record Number: [id number]  Chief Complaint   Patient presents with     RECHECK     HPI: Kelly Gilmore is a 89 year old (2/2/1927), who is being seen today for an episodic care visit at: [facility] ([facility]) [[id number]]. Today's concern is:   1. 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 Intra-abdominal abscess (H)    3. Mucus pooling in larynx    4. Generalized muscle weakness      Patient seen today for follow up, labs on 10/14 generally WNL, ostomy appliance intact with normal output, CT scan abdomen on 10/13 showed resolved abscess, denies gut pain, still having certain level of dysphagia with mucus in throat, able to clear, started on mucinex and omeprazole with mild improvement, no distress.    Allergies and PMH/PSH reviewed in EPIC today.    REVIEW OF SYSTEMS:  4 point ROS including Respiratory, CV, GI and , other than that noted in the HPI,  is negative    Objective:   /72   Pulse 89   Temp 97.2  F (36.2  C)   Resp 16   Ht 1.422 m (4' 8\")   Wt 38.6 kg (85 lb)   SpO2 96%   BMI 19.06 kg/m    Exam:  GENERAL APPEARANCE:  in no distress, thin  ENT:  Mouth and posterior oropharynx normal, moist mucous membranes  RESP:  lungs clear to auscultation   CV:  no edema  ABDOMEN:  bowel sounds normal  M/S:   Gait and station abnormal transfer assist  SKIN:  Inspection of skin and subcutaneous tissue baseline  NEURO:   Examination of sensation by touch normal  PSYCH:  oriented X 3, memory impaired     Labs:   Labs done in SNF are in [facility]. Please refer to them using [facility]/Care Everywhere.    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)  (primary encounter diagnosis)  (K65.1) Intra-abdominal abscess (H)  Weakness   Comment: non-surgical intervention, completed flagyl and ceftin  Plan:   -no additional f/u with GI indicated  -continue oxycodone for pain  -PT/OT wrapping up  -plans to discharge later this week to AL facility  -ordering WC today    (J38.7) Mucus " "pooling in larynx  Comment: mild dysphagia with GERD aspect  Plan: continue mucinex and omeprazole, appetite improved  -ST to follow up  -consider appetite stimulant  -staff to support as indicated      Electronically signed by: SWETA Yousif CNP         Documentation of Face-to-Face and Certification for DME     Patient: Kelly Gilmore   YOB: 1927  MR Number: [id number]  Today's Date: 10/18/2021    I certify that patient: Kelly Gilmore is under my care and that I, or a nurse practitioner or physician's assistant working with me, had a face-to-face encounter that meets the physician face-to-face encounter requirements with this patient on: 10/18/2021.    This encounter with the patient was in whole, or in part, for the following medical condition, which is the primary reason for DME:   1. 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 Intra-abdominal abscess (H)    3. Mucus pooling in larynx    4. Generalized muscle weakness          I certify that, based on my findings, the following DME ismedically necessary   1. The patient requires the following wheelchair specifications: 16\" wide x 16\" deep manual wheelchair. Recommend Amadou ht wc due pts short stature, regular/standard foot rests, desk arms (per pt preference), and standard foam cushion.     Lifetime  4'8\" 85lbs  Laurent Mcginnis NP  [phone]    My clinical findings support the need for the above DME because:   2. The patient has a mobility limitation that significantly impairs his/her ability to participate in one or more mobility-related activities of daily living (MRADLs) and that use of a manual wheelchair will significantly improve the patient s ability to participate in MRADLs.  3. The patient s mobility limitation cannot be sufficiently resolved by the use of an appropriately fitted cane or walker.  4. The patient s home provides adequate access between rooms, maneuvering space, and surfaces for use of the manual wheelchair that is " provided.  5. The patient has not expressed an unwillingness to use the manual wheelchair that is provided in the home.  6. The patient has sufficient upper extremity function and other physical and mental capabilities needed to safely self-propel or has a caregiver who is available, willing and able to provide assistance.    This patient will be followed by a physician who will periodically review the plan of care.  Physician/Provider to provide follow up care: Radha Sanchez    Responsible Medicare certified PEC Physician: Dr.Dan Morrell  Electronic Physician Signature  Date: 10/18/2021

## 2021-10-18 NOTE — LETTER
"    10/18/2021        RE: Kelly Gilmore  [address] [place] [id number]        [facility] SERVICES  [place] Medical Record Number: [id number]  Chief Complaint   Patient presents with     RECHECK     HPI: Kelly Gilmore is a 89 year old (2/2/1927), who is being seen today for an episodic care visit at: [facility] ([facility]) [[id number]]. Today's concern is:   1. 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 Intra-abdominal abscess (H)    3. Mucus pooling in larynx    4. Generalized muscle weakness      Patient seen today for follow up, labs on 10/14 generally WNL, ostomy appliance intact with normal output, CT scan abdomen on 10/13 showed resolved abscess, denies gut pain, still having certain level of dysphagia with mucus in throat, able to clear, started on mucinex and omeprazole with mild improvement, no distress.    Allergies and PMH/PSH reviewed in EPIC today.    REVIEW OF SYSTEMS:  4 point ROS including Respiratory, CV, GI and , other than that noted in the HPI,  is negative    Objective:   /72   Pulse 89   Temp 97.2  F (36.2  C)   Resp 16   Ht 1.422 m (4' 8\")   Wt 38.6 kg (85 lb)   SpO2 96%   BMI 19.06 kg/m    Exam:  GENERAL APPEARANCE:  in no distress, thin  ENT:  Mouth and posterior oropharynx normal, moist mucous membranes  RESP:  lungs clear to auscultation   CV:  no edema  ABDOMEN:  bowel sounds normal  M/S:   Gait and station abnormal transfer assist  SKIN:  Inspection of skin and subcutaneous tissue baseline  NEURO:   Examination of sensation by touch normal  PSYCH:  oriented X 3, memory impaired     Labs:   Labs done in SNF are in [facility]. Please refer to them using [employer]/Care Everywhere.    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)  (primary encounter diagnosis)  (K65.1) Intra-abdominal abscess (H)  Weakness   Comment: non-surgical intervention, completed flagyl and ceftin  Plan:   -no additional f/u with GI indicated  -continue oxycodone for pain  -PT/OT wrapping " "up  -plans to discharge later this week to AL facility  -ordering WC today    (J38.7) Mucus pooling in larynx  Comment: mild dysphagia with GERD aspect  Plan: continue mucinex and omeprazole, appetite improved  -ST to follow up  -consider appetite stimulant  -staff to support as indicated      Electronically signed by: SWETA Yousif CNP         Documentation of Face-to-Face and Certification for DME     Patient: Kelly Gilomre   YOB: 1927  MR Number: [id number]  Today's Date: 10/18/2021    I certify that patient: Kelly Gilmore is under my care and that I, or a nurse practitioner or physician's assistant working with me, had a face-to-face encounter that meets the physician face-to-face encounter requirements with this patient on: 10/18/2021.    This encounter with the patient was in whole, or in part, for the following medical condition, which is the primary reason for DME:   1. 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 Intra-abdominal abscess (H)    3. Mucus pooling in larynx    4. Generalized muscle weakness          I certify that, based on my findings, the following DME ismedically necessary   1. The patient requires the following wheelchair specifications: 16\" wide x 16\" deep manual wheelchair. Recommend Amadou ht wc due pts short stature, regular/standard foot rests, desk arms (per pt preference), and standard foam cushion.     [employer]  4'8\" 85lbs  Laurent Mcginnis NP  [phone]    My clinical findings support the need for the above DME because:   2. The patient has a mobility limitation that significantly impairs his/her ability to participate in one or more mobility-related activities of daily living (MRADLs) and that use of a manual wheelchair will significantly improve the patient s ability to participate in MRADLs.  3. The patient s mobility limitation cannot be sufficiently resolved by the use of an appropriately fitted cane or walker.  4. The patient s home provides adequate access " between rooms, maneuvering space, and surfaces for use of the manual wheelchair that is provided.  5. The patient has not expressed an unwillingness to use the manual wheelchair that is provided in the home.  6. The patient has sufficient upper extremity function and other physical and mental capabilities needed to safely self-propel or has a caregiver who is available, willing and able to provide assistance.    This patient will be followed by a physician who will periodically review the plan of care.  Physician/Provider to provide follow up care: Radha Sanchez    Responsible Medicare certified PECOS Physician: Dr.Dan Morrell  Electronic Physician Signature  Date: 10/18/2021          Sincerely,        SWETA Yousif CNP

## 2021-10-20 ENCOUNTER — DISCHARGE SUMMARY NURSING HOME (OUTPATIENT)
Dept: GERIATRICS | Facility: CLINIC | Age: 86
End: 2021-10-20
Payer: COMMERCIAL

## 2021-10-20 VITALS
OXYGEN SATURATION: 95 % | SYSTOLIC BLOOD PRESSURE: 112 MMHG | HEIGHT: 56 IN | TEMPERATURE: 97.8 F | HEART RATE: 85 BPM | DIASTOLIC BLOOD PRESSURE: 63 MMHG | BODY MASS INDEX: 19.12 KG/M2 | RESPIRATION RATE: 18 BRPM | WEIGHT: 85 LBS

## 2021-10-20 DIAGNOSIS — N18.30 STAGE 3 CHRONIC KIDNEY DISEASE, UNSPECIFIED WHETHER STAGE 3A OR 3B CKD (H): ICD-10-CM

## 2021-10-20 DIAGNOSIS — K65.1 INTRA-ABDOMINAL ABSCESS (H): Primary | ICD-10-CM

## 2021-10-20 DIAGNOSIS — J43.1 PANLOBULAR EMPHYSEMA (H): ICD-10-CM

## 2021-10-20 DIAGNOSIS — R11.0 NAUSEA: ICD-10-CM

## 2021-10-20 DIAGNOSIS — F32.A MILD DEPRESSION: ICD-10-CM

## 2021-10-20 DIAGNOSIS — Z93.2 ILEOSTOMY IN PLACE (H): ICD-10-CM

## 2021-10-20 DIAGNOSIS — D64.9 ANEMIA, UNSPECIFIED TYPE: ICD-10-CM

## 2021-10-20 PROCEDURE — 99316 NF DSCHRG MGMT 30 MIN+: CPT | Performed by: NURSE PRACTITIONER

## 2021-10-20 ASSESSMENT — MIFFLIN-ST. JEOR: SCORE: 643.56

## 2021-10-20 NOTE — LETTER
10/20/2021        RE: Kelly Gilmore  [address] [place] [id number]         HEALTH GERIATRIC SERVICES DISCHARGE SUMMARY  PATIENT'S NAME: Kelly Gilmore  YOB: 1927  MEDICAL RECORD NUMBER:  [id number]  Place of Service where encounter took place:  [facility] (TCU) [[id number]]    PRIMARY CARE PROVIDER AND CLINIC RESPONSIBLE AFTER TRANSFER:   [facility] [address] [facility]    Assisted Living: [facility] ([facility])     Transferring providers: SWETA Yousif CNP  Recent Hospitalization/ED:  [facility]  stay 9/20/21 to 9/29/21.  Date of SNF Admission: September 29, 2021  Date of SNF (anticipated) Discharge: October 20, 2021  Discharged to: previous independent home  Cognitive Scores: SLUMS: 22/30  Physical Function: Pivot transfers  DME: Wheelchair, Hospital Bed, Walker and Home Oxygen    CODE STATUS/ADVANCE DIRECTIVES DISCUSSION:  No Order   ALLERGIES: Patient has no known allergies.    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)  (R11.0) Nausea  Comment: ANW 9/20-29, no surgical intervention  Plan:  -completed ceftin and flagyl x14 days  -follow up ANW CT scan done on 10/13, abscess resolved  -PT/OT to eval and treat at AL facility  -DC'd tramadol  -started oxycodone 5mg Q4h PRN  -ordered WC, delivered already  -add APAP 1000mg TID scheduled  -increase amlodipine from 2.5 to 5mg   -started zofran 4mg TID PRN and omeprazole BID  -plan to follow up PCP in 1 week        (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)  Comment: no cough, mild dimness R base  Plan: continue current pulmonary regimen  -started mucinex 600mg Qam, omeprazole     (N18.30) Stage 3 chronic kidney disease, unspecified whether stage 3a or 3b  CKD (H)  Comment: creat in hospital in the 0.8-0.9 range  Plan: dose medication renally as possible  -periodic BMP     (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)  Comment: with anxiety component, controlled  Plan: continue sertraline 150mg daily  -GDS/PHQ testing if symptomatic     (D64.9) Anemia, unspecified type  Comment: Hgb's in hospital 7.6-8.4, no s/s bleeding/bruising  Plan: Hgb 10/14 was 9.6  -periodic Hgb's       Discharge Medications:    Current Outpatient Medications   Medication Sig Dispense Refill     acetaminophen (TYLENOL) 500 MG tablet Take 2 tablets (1,000 mg) by mouth 3 times daily 90 tablet 0     albuterol (PROAIR HFA/PROVENTIL HFA/VENTOLIN HFA) 108 (90 Base) MCG/ACT inhaler Inhale 2 puffs into the lungs every 4 hours as needed for shortness of breath / dyspnea or wheezing       amLODIPine (NORVASC) 2.5 MG tablet Take 5 mg by mouth daily       clobetasol (TEMOVATE) 0.05 % CREA cream Apply topically 2 times daily       cyanocobalamin (VITAMIN B-12) 1000 MCG tablet Take 1,000 mcg by mouth daily       Fluticasone-Umeclidin-Vilanterol (TRELEGY ELLIPTA) 100-62.5-25 MCG/INH oral inhaler Inhale 1 puff into the lungs daily       guaiFENesin (MUCINEX) 600 MG 12 hr tablet Take 2 tablets (1,200 mg) by mouth daily 30 tablet 0     ipratropium - albuterol 0.5 mg/2.5 mg/3 mL (DUONEB) 0.5-2.5 (3) MG/3ML neb solution Take 1 vial by nebulization every 6 hours as needed for shortness of breath / dyspnea or wheezing       losartan (COZAAR) 50 MG tablet Take 50 mg by mouth daily       MULTIPLE VITAMIN PO Take 1 tablet by mouth daily       omeprazole (PRILOSEC) 20 MG DR capsule Take 1 capsule (20 mg) by mouth 2 times daily 60 capsule 0     ondansetron (ZOFRAN) 4 MG tablet Take 1 tablet (4 mg) by mouth every 8 hours as needed for nausea 30 tablet 0     oxyCODONE (ROXICODONE) 5 MG tablet Take 1 tablet (5 mg) by mouth every 4 hours as needed for pain 30 tablet 0     potassium chloride ER (K-TAB) 20 MEQ CR tablet Take 1 tablet (20 mEq)  "by mouth daily 30 tablet 0     rOPINIRole (REQUIP) 0.5 MG tablet Take 0.5 mg by mouth At Bedtime       sertraline (ZOLOFT) 100 MG tablet Take 150 mg by mouth daily       triamcinolone (KENALOG) 0.1 % external cream Apply topically 2 times daily as needed for irritation       Vitamin D, Cholecalciferol, 25 MCG (1000 UT) TABS Take 2 tablets by mouth daily          Controlled medications:   Oxycodone 5mg Q4h, OK to send with meds on cart     Past Medical History: No past medical history on file.  Physical Exam:   Vitals: /63   Pulse 85   Temp 97.8  F (36.6  C)   Resp 18   Ht 1.422 m (4' 8\")   Wt 38.6 kg (85 lb)   SpO2 95%   BMI 19.06 kg/m    BMI= Body mass index is 19.06 kg/m .  GENERAL APPEARANCE:  in no distress, appears healthy, thin  ENT:  Mouth and posterior oropharynx normal, moist mucous membranes  RESP:  lungs clear to auscultation   CV:  no edema, rate-normal  ABDOMEN:  bowel sounds normal  M/S:   Gait and station abnormal transfer assist  SKIN:  Inspection of skin and subcutaneous tissue baseline  NEURO:   Examination of sensation by touch normal  PSYCH:  oriented X 3, memory impaired      SNF labs: Labs done in SNF are in [place] EPIC. Please refer to them using [employer]/Care Everywhere.    DISCHARGE PLAN:    Follow up labs: No labs orders/due    Medical Follow Up:      Follow up with primary care provider in 1 weeks    Current [place] scheduled appointments:   NA    Discharge Services: Home Care:  Occupational Therapy, Physical Therapy, Registered Nurse and Home Health Aide    Discharge Instructions Verbalized to Patient at Discharge:     None    TOTAL DISCHARGE TIME:   Greater than 30 minutes  Electronically signed by:  SWETA Yousif CNP         Documentation of Face-to-Face and Certification for Home Health Services     Patient: Kelly Gilmore   YOB: 1927  MR Number: [id number]  Today's Date: 10/20/2021    I certify that patient: Kelly Gilmore is under my care " and that I, or a nurse practitioner or physician's assistant working with me, had a face-to-face encounter that meets the physician face-to-face encounter requirements with this patient on: 10/20/2021.    This encounter with the patient was in whole, or in part, for the following medical condition, which is the primary reason for home health care: No diagnosis found.      I certify that, based on my findings, the following services are medically necessary home health services: Nursing, Occupational Therapy, Physical Therapy and HHA.    My clinical findings support the need for the above services because: Nurse is needed: To provide assessment and oversight required in the home to assure adherence to the medical plan due to: med changes, compliance issues.., Occupational Therapy Services are needed to assess and treat cognitive ability and address ADL safety due to impairment in new DME/WC use., Physical Therapy Services are needed to assess and treat the following functional impairments: weakness, gait instability. and HHA for bathing safety    Further, I certify that my clinical findings support that this patient is homebound (i.e. absences from home require considerable and taxing effort and are for medical reasons or [employer] services or infrequently or of short duration when for other reasons) because: Requires assistance of another person or specialized equipment to access medical services because patient: Is unable to operate assistive equipment on their own...    Based on the above findings. I certify that this patient is confined to the home and needs intermittent skilled nursing care, physical therapy and/or speech therapy.  The patient is under my care, and I have initiated the establishment of the plan of care.  This patient will be followed by a physician who will periodically review the plan of care.  Physician/Provider to provide follow up care: Radha Sanchez Medicare certified ANDI  Physician: Dr.Dan Morrell  Electronic Physician Signature  Date: 10/20/2021                    Sincerely,        SWETA Yousif CNP

## 2021-10-20 NOTE — PROGRESS NOTES
[facility] DISCHARGE SUMMARY  PATIENT'S NAME: Kelly Gilmore  YOB: 1927  MEDICAL RECORD NUMBER:  [id number]  Place of Service where encounter took place:  [facility] ([facility]) [[id number]]    PRIMARY CARE PROVIDER AND CLINIC RESPONSIBLE AFTER TRANSFER:   [facility] [address] [place] / [facility]    Assisted Living: [facility] ([facility])     Transferring providers: SWETA Yousif CNP  Recent Hospitalization/ED:  [facility]  stay 9/20/21 to 9/29/21.  Date of SNF Admission: September 29, 2021  Date of SNF (anticipated) Discharge: October 20, 2021  Discharged to: previous independent home  Cognitive Scores: SLUMS: 22/30  Physical Function: Pivot transfers  DME: Wheelchair, Hospital Bed, Walker and Home Oxygen    CODE STATUS/ADVANCE DIRECTIVES DISCUSSION:  No Order   ALLERGIES: Patient has no known allergies.    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)  (R11.0) Nausea  Comment: ANW 9/20-29, no surgical intervention  Plan:  -completed ceftin and flagyl x14 days  -follow up ANW CT scan done on 10/13, abscess resolved  -PT/OT to eval and treat at AL facility  -DC'd tramadol  -started oxycodone 5mg Q4h PRN  -ordered WC, delivered already  -add APAP 1000mg TID scheduled  -increase amlodipine from 2.5 to 5mg   -started zofran 4mg TID PRN and omeprazole BID  -plan to follow up PCP in 1 week        (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)  Comment: no cough, mild dimness R base  Plan: continue current pulmonary regimen  -started mucinex  600mg Qam, omeprazole     (N18.30) Stage 3 chronic kidney disease, unspecified whether stage 3a or 3b CKD (H)  Comment: creat in hospital in the 0.8-0.9 range  Plan: dose medication renally as possible  -periodic BMP     (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)  Comment: with anxiety component, controlled  Plan: continue sertraline 150mg daily  -GDS/PHQ testing if symptomatic     (D64.9) Anemia, unspecified type  Comment: Hgb's in hospital 7.6-8.4, no s/s bleeding/bruising  Plan: Hgb 10/14 was 9.6  -periodic Hgb's       Discharge Medications:    Current Outpatient Medications   Medication Sig Dispense Refill     acetaminophen (TYLENOL) 500 MG tablet Take 2 tablets (1,000 mg) by mouth 3 times daily 90 tablet 0     albuterol (PROAIR HFA/PROVENTIL HFA/VENTOLIN HFA) 108 (90 Base) MCG/ACT inhaler Inhale 2 puffs into the lungs every 4 hours as needed for shortness of breath / dyspnea or wheezing       amLODIPine (NORVASC) 2.5 MG tablet Take 5 mg by mouth daily       clobetasol (TEMOVATE) 0.05 % CREA cream Apply topically 2 times daily       cyanocobalamin (VITAMIN B-12) 1000 MCG tablet Take 1,000 mcg by mouth daily       Fluticasone-Umeclidin-Vilanterol (TRELEGY ELLIPTA) 100-62.5-25 MCG/INH oral inhaler Inhale 1 puff into the lungs daily       guaiFENesin (MUCINEX) 600 MG 12 hr tablet Take 2 tablets (1,200 mg) by mouth daily 30 tablet 0     ipratropium - albuterol 0.5 mg/2.5 mg/3 mL (DUONEB) 0.5-2.5 (3) MG/3ML neb solution Take 1 vial by nebulization every 6 hours as needed for shortness of breath / dyspnea or wheezing       losartan (COZAAR) 50 MG tablet Take 50 mg by mouth daily       MULTIPLE VITAMIN PO Take 1 tablet by mouth daily       omeprazole (PRILOSEC) 20 MG DR capsule Take 1 capsule (20 mg) by mouth 2 times daily 60 capsule 0     ondansetron (ZOFRAN) 4 MG tablet Take 1 tablet (4 mg) by mouth every 8 hours as needed for nausea 30 tablet 0     oxyCODONE (ROXICODONE) 5 MG tablet Take 1 tablet (5 mg) by mouth every 4 hours as  "needed for pain 30 tablet 0     potassium chloride ER (K-TAB) 20 MEQ CR tablet Take 1 tablet (20 mEq) by mouth daily 30 tablet 0     rOPINIRole (REQUIP) 0.5 MG tablet Take 0.5 mg by mouth At Bedtime       sertraline (ZOLOFT) 100 MG tablet Take 150 mg by mouth daily       triamcinolone (KENALOG) 0.1 % external cream Apply topically 2 times daily as needed for irritation       Vitamin D, Cholecalciferol, 25 MCG (1000 UT) TABS Take 2 tablets by mouth daily          Controlled medications:   Oxycodone 5mg Q4h, OK to send with meds on cart     Past Medical History: No past medical history on file.  Physical Exam:   Vitals: /63   Pulse 85   Temp 97.8  F (36.6  C)   Resp 18   Ht 1.422 m (4' 8\")   Wt 38.6 kg (85 lb)   SpO2 95%   BMI 19.06 kg/m    BMI= Body mass index is 19.06 kg/m .  GENERAL APPEARANCE:  in no distress, appears healthy, thin  ENT:  Mouth and posterior oropharynx normal, moist mucous membranes  RESP:  lungs clear to auscultation   CV:  no edema, rate-normal  ABDOMEN:  bowel sounds normal  M/S:   Gait and station abnormal transfer assist  SKIN:  Inspection of skin and subcutaneous tissue baseline  NEURO:   Examination of sensation by touch normal  PSYCH:  oriented X 3, memory impaired      SNF labs: Labs done in SNF are in [place] EPIC. Please refer to them using [employer]/Care Everywhere.    DISCHARGE PLAN:    Follow up labs: No labs orders/due    Medical Follow Up:      Follow up with primary care provider in 1 weeks    Current [place] scheduled appointments:   NA    Discharge Services: Home Care:  Occupational Therapy, Physical Therapy, Registered Nurse and Home Health Aide    Discharge Instructions Verbalized to Patient at Discharge:     None    TOTAL DISCHARGE TIME:   Greater than 30 minutes  Electronically signed by:  SWETA Yousif CNP         Documentation of Face-to-Face and Certification for Home Health Services     Patient: Kelly Gilmore   YOB: 1927  MR " Number: [id number]  Today's Date: 10/20/2021    I certify that patient: Kelly Gilmore is under my care and that I, or a nurse practitioner or physician's assistant working with me, had a face-to-face encounter that meets the physician face-to-face encounter requirements with this patient on: 10/20/2021.    This encounter with the patient was in whole, or in part, for the following medical condition, which is the primary reason for home health care: No diagnosis found.      I certify that, based on my findings, the following services are medically necessary home health services: Nursing, Occupational Therapy, Physical Therapy and HHA.    My clinical findings support the need for the above services because: Nurse is needed: To provide assessment and oversight required in the home to assure adherence to the medical plan due to: med changes, compliance issues.., Occupational Therapy Services are needed to assess and treat cognitive ability and address ADL safety due to impairment in new DME/WC use., Physical Therapy Services are needed to assess and treat the following functional impairments: weakness, gait instability. and HHA for bathing safety    Further, I certify that my clinical findings support that this patient is homebound (i.e. absences from home require considerable and taxing effort and are for medical reasons or [employer] services or infrequently or of short duration when for other reasons) because: Requires assistance of another person or specialized equipment to access medical services because patient: Is unable to operate assistive equipment on their own...    Based on the above findings. I certify that this patient is confined to the home and needs intermittent skilled nursing care, physical therapy and/or speech therapy.  The patient is under my care, and I have initiated the establishment of the plan of care.  This patient will be followed by a physician who will periodically review the plan of  care.  Physician/Provider to provide follow up care: Radha Sanchez    [facility] Medicare certified PECOS Physician: Dr.Dan Morrell  Electronic Physician Signature  Date: 10/20/2021

## 2021-10-20 NOTE — LETTER
10/20/2021        RE: Kelly Gilmore  [address] [place] [id number]         HEALTH GERIATRIC SERVICES DISCHARGE SUMMARY  PATIENT'S NAME: Kelly Gilmore  YOB: 1927  MEDICAL RECORD NUMBER:  [id number]  Place of Service where encounter took place:  [facility] (TCU) [[id number]]    PRIMARY CARE PROVIDER AND CLINIC RESPONSIBLE AFTER TRANSFER:   [facility] [address] [facility]    Assisted Living: [facility] ([facility])     Transferring providers: SWETA Yousif CNP  Recent Hospitalization/ED:  [facility]  stay 9/20/21 to 9/29/21.  Date of SNF Admission: September 29, 2021  Date of SNF (anticipated) Discharge: October 20, 2021  Discharged to: previous independent home  Cognitive Scores: SLUMS: 22/30  Physical Function: Pivot transfers  DME: Wheelchair, Hospital Bed, Walker and Home Oxygen    CODE STATUS/ADVANCE DIRECTIVES DISCUSSION:  No Order   ALLERGIES: Patient has no known allergies.    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)  (R11.0) Nausea  Comment: ANW 9/20-29, no surgical intervention  Plan:  -completed ceftin and flagyl x14 days  -follow up ANW CT scan done on 10/13, abscess resolved  -PT/OT to eval and treat at AL facility  -DC'd tramadol  -started oxycodone 5mg Q4h PRN  -ordered WC, delivered already  -add APAP 1000mg TID scheduled  -increase amlodipine from 2.5 to 5mg   -started zofran 4mg TID PRN and omeprazole BID  -plan to follow up PCP in 1 week        (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)  Comment:  no cough, mild dimness R base  Plan: continue current pulmonary regimen  -started mucinex 600mg Qam, omeprazole     (N18.30) Stage 3 chronic kidney disease, unspecified whether stage 3a or 3b CKD (H)  Comment: creat in hospital in the 0.8-0.9 range  Plan: dose medication renally as possible  -periodic BMP     (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)  Comment: with anxiety component, controlled  Plan: continue sertraline 150mg daily  -GDS/PHQ testing if symptomatic     (D64.9) Anemia, unspecified type  Comment: Hgb's in hospital 7.6-8.4, no s/s bleeding/bruising  Plan: Hgb 10/14 was 9.6  -periodic Hgb's       Discharge Medications:    Current Outpatient Medications   Medication Sig Dispense Refill     acetaminophen (TYLENOL) 500 MG tablet Take 2 tablets (1,000 mg) by mouth 3 times daily 90 tablet 0     albuterol (PROAIR HFA/PROVENTIL HFA/VENTOLIN HFA) 108 (90 Base) MCG/ACT inhaler Inhale 2 puffs into the lungs every 4 hours as needed for shortness of breath / dyspnea or wheezing       amLODIPine (NORVASC) 2.5 MG tablet Take 5 mg by mouth daily       clobetasol (TEMOVATE) 0.05 % CREA cream Apply topically 2 times daily       cyanocobalamin (VITAMIN B-12) 1000 MCG tablet Take 1,000 mcg by mouth daily       Fluticasone-Umeclidin-Vilanterol (TRELEGY ELLIPTA) 100-62.5-25 MCG/INH oral inhaler Inhale 1 puff into the lungs daily       guaiFENesin (MUCINEX) 600 MG 12 hr tablet Take 2 tablets (1,200 mg) by mouth daily 30 tablet 0     ipratropium - albuterol 0.5 mg/2.5 mg/3 mL (DUONEB) 0.5-2.5 (3) MG/3ML neb solution Take 1 vial by nebulization every 6 hours as needed for shortness of breath / dyspnea or wheezing       losartan (COZAAR) 50 MG tablet Take 50 mg by mouth daily       MULTIPLE VITAMIN PO Take 1 tablet by mouth daily       omeprazole (PRILOSEC) 20 MG DR capsule Take 1 capsule (20 mg) by mouth 2 times daily 60 capsule 0     ondansetron (ZOFRAN) 4 MG tablet Take 1 tablet (4 mg) by mouth every 8 hours as needed for nausea 30 tablet  "0     oxyCODONE (ROXICODONE) 5 MG tablet Take 1 tablet (5 mg) by mouth every 4 hours as needed for pain 30 tablet 0     potassium chloride ER (K-TAB) 20 MEQ CR tablet Take 1 tablet (20 mEq) by mouth daily 30 tablet 0     rOPINIRole (REQUIP) 0.5 MG tablet Take 0.5 mg by mouth At Bedtime       sertraline (ZOLOFT) 100 MG tablet Take 150 mg by mouth daily       triamcinolone (KENALOG) 0.1 % external cream Apply topically 2 times daily as needed for irritation       Vitamin D, Cholecalciferol, 25 MCG (1000 UT) TABS Take 2 tablets by mouth daily          Controlled medications:   Oxycodone 5mg Q4h, OK to send with meds on cart     Past Medical History: No past medical history on file.  Physical Exam:   Vitals: /63   Pulse 85   Temp 97.8  F (36.6  C)   Resp 18   Ht 1.422 m (4' 8\")   Wt 38.6 kg (85 lb)   SpO2 95%   BMI 19.06 kg/m    BMI= Body mass index is 19.06 kg/m .  GENERAL APPEARANCE:  in no distress, appears healthy, thin  ENT:  Mouth and posterior oropharynx normal, moist mucous membranes  RESP:  lungs clear to auscultation   CV:  no edema, rate-normal  ABDOMEN:  bowel sounds normal  M/S:   Gait and station abnormal transfer assist  SKIN:  Inspection of skin and subcutaneous tissue baseline  NEURO:   Examination of sensation by touch normal  PSYCH:  oriented X 3, memory impaired      SNF labs: Labs done in SNF are in [facility]. Please refer to them using [facility]/Care Everywhere.    DISCHARGE PLAN:    Follow up labs: No labs orders/due    Medical Follow Up:      Follow up with primary care provider in 1 weeks    Current [place] scheduled appointments:   NA    Discharge Services: Home Care:  Occupational Therapy, Physical Therapy, Registered Nurse and Home Health Aide    Discharge Instructions Verbalized to Patient at Discharge:     None    TOTAL DISCHARGE TIME:   Greater than 30 minutes  Electronically signed by:  SWETA Yousif CNP         Documentation of Face-to-Face and Certification " for Home Health Services     Patient: Kelly Gilmore   YOB: 1927  MR Number: [id number]  Today's Date: 10/20/2021    I certify that patient: Kelly Gilmore is under my care and that I, or a nurse practitioner or physician's assistant working with me, had a face-to-face encounter that meets the physician face-to-face encounter requirements with this patient on: 10/20/2021.    This encounter with the patient was in whole, or in part, for the following medical condition, which is the primary reason for home health care: No diagnosis found.      I certify that, based on my findings, the following services are medically necessary home health services: Nursing, Occupational Therapy, Physical Therapy and HHA.    My clinical findings support the need for the above services because: Nurse is needed: To provide assessment and oversight required in the home to assure adherence to the medical plan due to: med changes, compliance issues.., Occupational Therapy Services are needed to assess and treat cognitive ability and address ADL safety due to impairment in new DME/WC use., Physical Therapy Services are needed to assess and treat the following functional impairments: weakness, gait instability. and HHA for bathing safety    Further, I certify that my clinical findings support that this patient is homebound (i.e. absences from home require considerable and taxing effort and are for medical reasons or [employer] services or infrequently or of short duration when for other reasons) because: Requires assistance of another person or specialized equipment to access medical services because patient: Is unable to operate assistive equipment on their own...    Based on the above findings. I certify that this patient is confined to the home and needs intermittent skilled nursing care, physical therapy and/or speech therapy.  The patient is under my care, and I have initiated the establishment of the plan of care.  This  patient will be followed by a physician who will periodically review the plan of care.  Physician/Provider to provide follow up care: Radha Sanchez    [facility] Medicare certified PECOS Physician: Dr.Dan Morrell  Electronic Physician Signature  Date: 10/20/2021                    Sincerely,        SWETA Yousif CNP

## 2021-10-20 NOTE — LETTER
10/20/2021        RE: Kelly Gilmore  [address] [place] [id number]         HEALTH GERIATRIC SERVICES DISCHARGE SUMMARY  PATIENT'S NAME: Kelly Gilmore  YOB: 1927  MEDICAL RECORD NUMBER:  [id number]  Place of Service where encounter took place:  [facility] (TCU) [[id number]]    PRIMARY CARE PROVIDER AND CLINIC RESPONSIBLE AFTER TRANSFER:   [facility]al [facility] [address] [facility]    Assisted Living: [facility] ([facility])     Transferring providers: SWETA Yousif CNP  Recent Hospitalization/ED:  [facility]  stay 9/20/21 to 9/29/21.  Date of SNF Admission: September 29, 2021  Date of SNF (anticipated) Discharge: October 20, 2021  Discharged to: previous independent home  Cognitive Scores: SLUMS: 22/30  Physical Function: Pivot transfers  DME: Wheelchair, Hospital Bed, Walker and Home Oxygen    CODE STATUS/ADVANCE DIRECTIVES DISCUSSION:  No Order   ALLERGIES: Patient has no known allergies.    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)  (R11.0) Nausea  Comment: ANW 9/20-29, no surgical intervention  Plan:  -completed ceftin and flagyl x14 days  -follow up ANW CT scan done on 10/13, abscess resolved  -PT/OT to eval and treat at AL facility  -DC'd tramadol  -started oxycodone 5mg Q4h PRN  -ordered WC, delivered already  -add APAP 1000mg TID scheduled  -increase amlodipine from 2.5 to 5mg   -started zofran 4mg TID PRN and omeprazole BID  -plan to follow up PCP in 1 week        (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)  Comment: no cough, mild dimness R base  Plan: continue current pulmonary regimen  -started mucinex 600mg Qam, omeprazole     (N18.30) Stage 3 chronic kidney disease, unspecified whether stage 3a or 3b  CKD (H)  Comment: creat in hospital in the 0.8-0.9 range  Plan: dose medication renally as possible  -periodic BMP     (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)  Comment: with anxiety component, controlled  Plan: continue sertraline 150mg daily  -GDS/PHQ testing if symptomatic     (D64.9) Anemia, unspecified type  Comment: Hgb's in hospital 7.6-8.4, no s/s bleeding/bruising  Plan: Hgb 10/14 was 9.6  -periodic Hgb's       Discharge Medications:    Current Outpatient Medications   Medication Sig Dispense Refill     acetaminophen (TYLENOL) 500 MG tablet Take 2 tablets (1,000 mg) by mouth 3 times daily 90 tablet 0     albuterol (PROAIR HFA/PROVENTIL HFA/VENTOLIN HFA) 108 (90 Base) MCG/ACT inhaler Inhale 2 puffs into the lungs every 4 hours as needed for shortness of breath / dyspnea or wheezing       amLODIPine (NORVASC) 2.5 MG tablet Take 5 mg by mouth daily       clobetasol (TEMOVATE) 0.05 % CREA cream Apply topically 2 times daily       cyanocobalamin (VITAMIN B-12) 1000 MCG tablet Take 1,000 mcg by mouth daily       Fluticasone-Umeclidin-Vilanterol (TRELEGY ELLIPTA) 100-62.5-25 MCG/INH oral inhaler Inhale 1 puff into the lungs daily       guaiFENesin (MUCINEX) 600 MG 12 hr tablet Take 2 tablets (1,200 mg) by mouth daily 30 tablet 0     ipratropium - albuterol 0.5 mg/2.5 mg/3 mL (DUONEB) 0.5-2.5 (3) MG/3ML neb solution Take 1 vial by nebulization every 6 hours as needed for shortness of breath / dyspnea or wheezing       losartan (COZAAR) 50 MG tablet Take 50 mg by mouth daily       MULTIPLE VITAMIN PO Take 1 tablet by mouth daily       omeprazole (PRILOSEC) 20 MG DR capsule Take 1 capsule (20 mg) by mouth 2 times daily 60 capsule 0     ondansetron (ZOFRAN) 4 MG tablet Take 1 tablet (4 mg) by mouth every 8 hours as needed for nausea 30 tablet 0     oxyCODONE (ROXICODONE) 5 MG tablet Take 1 tablet (5 mg) by mouth every 4 hours as needed for pain 30 tablet 0     potassium chloride ER (K-TAB) 20 MEQ CR tablet Take 1 tablet (20 mEq)  "by mouth daily 30 tablet 0     rOPINIRole (REQUIP) 0.5 MG tablet Take 0.5 mg by mouth At Bedtime       sertraline (ZOLOFT) 100 MG tablet Take 150 mg by mouth daily       triamcinolone (KENALOG) 0.1 % external cream Apply topically 2 times daily as needed for irritation       Vitamin D, Cholecalciferol, 25 MCG (1000 UT) TABS Take 2 tablets by mouth daily          Controlled medications:   Oxycodone 5mg Q4h, OK to send with meds on cart     Past Medical History: No past medical history on file.  Physical Exam:   Vitals: /63   Pulse 85   Temp 97.8  F (36.6  C)   Resp 18   Ht 1.422 m (4' 8\")   Wt 38.6 kg (85 lb)   SpO2 95%   BMI 19.06 kg/m    BMI= Body mass index is 19.06 kg/m .  GENERAL APPEARANCE:  in no distress, appears healthy, thin  ENT:  Mouth and posterior oropharynx normal, moist mucous membranes  RESP:  lungs clear to auscultation   CV:  no edema, rate-normal  ABDOMEN:  bowel sounds normal  M/S:   Gait and station abnormal transfer assist  SKIN:  Inspection of skin and subcutaneous tissue baseline  NEURO:   Examination of sensation by touch normal  PSYCH:  oriented X 3, memory impaired      SNF labs: Labs done in SNF are in [place] [facility]. Please refer to them using [employer]/Care Everywhere.    DISCHARGE PLAN:    Follow up labs: No labs orders/due    Medical Follow Up:      Follow up with primary care provider in 1 weeks    Current [place] scheduled appointments:   NA    Discharge Services: Home Care:  Occupational Therapy, Physical Therapy, Registered Nurse and Home Health Aide    Discharge Instructions Verbalized to Patient at Discharge:     None    TOTAL DISCHARGE TIME:   Greater than 30 minutes  Electronically signed by:  SWETA Yousif CNP         Documentation of Face-to-Face and Certification for Home Health Services     Patient: Kelly Gilmore   YOB: 1927  MR Number: [id number]  Today's Date: 10/20/2021    I certify that patient: Kelly Gilmore is under my care " and that I, or a nurse practitioner or physician's assistant working with me, had a face-to-face encounter that meets the physician face-to-face encounter requirements with this patient on: 10/20/2021.    This encounter with the patient was in whole, or in part, for the following medical condition, which is the primary reason for home health care: No diagnosis found.      I certify that, based on my findings, the following services are medically necessary home health services: Nursing, Occupational Therapy, Physical Therapy and HHA.    My clinical findings support the need for the above services because: Nurse is needed: To provide assessment and oversight required in the home to assure adherence to the medical plan due to: med changes, compliance issues.., Occupational Therapy Services are needed to assess and treat cognitive ability and address ADL safety due to impairment in new DME/WC use., Physical Therapy Services are needed to assess and treat the following functional impairments: weakness, gait instability. and HHA for bathing safety    Further, I certify that my clinical findings support that this patient is homebound (i.e. absences from home require considerable and taxing effort and are for medical reasons or [employer] services or infrequently or of short duration when for other reasons) because: Requires assistance of another person or specialized equipment to access medical services because patient: Is unable to operate assistive equipment on their own...    Based on the above findings. I certify that this patient is confined to the home and needs intermittent skilled nursing care, physical therapy and/or speech therapy.  The patient is under my care, and I have initiated the establishment of the plan of care.  This patient will be followed by a physician who will periodically review the plan of care.  Physician/Provider to provide follow up care: Radha Sanchez Medicare certified ANDI  Physician: Dr.Dan Morrell  Electronic Physician Signature  Date: 10/20/2021                    Sincerely,        SWETA Yousif CNP

## 2021-12-06 ENCOUNTER — LAB REQUISITION (OUTPATIENT)
Dept: LAB | Facility: CLINIC | Age: 86
End: 2021-12-06
Payer: COMMERCIAL

## 2021-12-06 DIAGNOSIS — R30.0 DYSURIA: ICD-10-CM

## 2021-12-06 LAB
ALBUMIN UR-MCNC: NEGATIVE MG/DL
APPEARANCE UR: CLEAR
BILIRUB UR QL STRIP: NEGATIVE
COLOR UR AUTO: NORMAL
GLUCOSE UR STRIP-MCNC: NEGATIVE MG/DL
HGB UR QL STRIP: NEGATIVE
KETONES UR STRIP-MCNC: NEGATIVE MG/DL
LEUKOCYTE ESTERASE UR QL STRIP: NEGATIVE
NITRATE UR QL: NEGATIVE
PH UR STRIP: 5.5 [PH] (ref 5–7)
SP GR UR STRIP: 1.02 (ref 1–1.03)
UROBILINOGEN UR STRIP-MCNC: <2 MG/DL

## 2021-12-06 PROCEDURE — 87086 URINE CULTURE/COLONY COUNT: CPT | Mod: ORL

## 2021-12-06 PROCEDURE — 81003 URINALYSIS AUTO W/O SCOPE: CPT | Mod: ORL

## 2021-12-07 LAB — BACTERIA UR CULT: NO GROWTH

## 2021-12-08 ENCOUNTER — LAB REQUISITION (OUTPATIENT)
Dept: LAB | Facility: CLINIC | Age: 86
End: 2021-12-08
Payer: COMMERCIAL

## 2021-12-08 DIAGNOSIS — E87.6 HYPOKALEMIA: ICD-10-CM

## 2021-12-08 DIAGNOSIS — I10 ESSENTIAL (PRIMARY) HYPERTENSION: ICD-10-CM

## 2021-12-08 DIAGNOSIS — D51.3 OTHER DIETARY VITAMIN B12 DEFICIENCY ANEMIA: ICD-10-CM

## 2021-12-09 LAB
ALBUMIN SERPL-MCNC: 3.5 G/DL (ref 3.5–5)
ALP SERPL-CCNC: 77 U/L (ref 45–120)
ALT SERPL W P-5'-P-CCNC: 12 U/L (ref 0–45)
ANION GAP SERPL CALCULATED.3IONS-SCNC: 10 MMOL/L (ref 5–18)
AST SERPL W P-5'-P-CCNC: 27 U/L (ref 0–40)
BASOPHILS # BLD AUTO: 0.1 10E3/UL (ref 0–0.2)
BASOPHILS NFR BLD AUTO: 2 %
BILIRUB SERPL-MCNC: 0.5 MG/DL (ref 0–1)
BUN SERPL-MCNC: 10 MG/DL (ref 8–28)
CALCIUM SERPL-MCNC: 9 MG/DL (ref 8.5–10.5)
CHLORIDE BLD-SCNC: 105 MMOL/L (ref 98–107)
CO2 SERPL-SCNC: 23 MMOL/L (ref 22–31)
CREAT SERPL-MCNC: 0.74 MG/DL (ref 0.6–1.1)
EOSINOPHIL # BLD AUTO: 0.1 10E3/UL (ref 0–0.7)
EOSINOPHIL NFR BLD AUTO: 2 %
ERYTHROCYTE [DISTWIDTH] IN BLOOD BY AUTOMATED COUNT: 18 % (ref 10–15)
GFR SERPL CREATININE-BSD FRML MDRD: 70 ML/MIN/1.73M2
GLUCOSE BLD-MCNC: 94 MG/DL (ref 70–125)
HCT VFR BLD AUTO: 27.6 % (ref 35–47)
HGB BLD-MCNC: 8.4 G/DL (ref 11.7–15.7)
IMM GRANULOCYTES # BLD: 0 10E3/UL
IMM GRANULOCYTES NFR BLD: 1 %
LYMPHOCYTES # BLD AUTO: 1.4 10E3/UL (ref 0.8–5.3)
LYMPHOCYTES NFR BLD AUTO: 18 %
MCH RBC QN AUTO: 30 PG (ref 26.5–33)
MCHC RBC AUTO-ENTMCNC: 30.4 G/DL (ref 31.5–36.5)
MCV RBC AUTO: 99 FL (ref 78–100)
MONOCYTES # BLD AUTO: 1 10E3/UL (ref 0–1.3)
MONOCYTES NFR BLD AUTO: 13 %
NEUTROPHILS # BLD AUTO: 4.8 10E3/UL (ref 1.6–8.3)
NEUTROPHILS NFR BLD AUTO: 64 %
NRBC # BLD AUTO: 0 10E3/UL
NRBC BLD AUTO-RTO: 0 /100
PLATELET # BLD AUTO: 262 10E3/UL (ref 150–450)
POTASSIUM BLD-SCNC: 4.8 MMOL/L (ref 3.5–5)
PROT SERPL-MCNC: 6.5 G/DL (ref 6–8)
RBC # BLD AUTO: 2.8 10E6/UL (ref 3.8–5.2)
SODIUM SERPL-SCNC: 138 MMOL/L (ref 136–145)
TSH SERPL DL<=0.005 MIU/L-ACNC: 2.32 UIU/ML (ref 0.3–5)
VIT B12 SERPL-MCNC: 1250 PG/ML (ref 213–816)
WBC # BLD AUTO: 7.4 10E3/UL (ref 4–11)

## 2021-12-09 PROCEDURE — 84443 ASSAY THYROID STIM HORMONE: CPT | Mod: ORL

## 2021-12-09 PROCEDURE — 82607 VITAMIN B-12: CPT | Mod: ORL

## 2021-12-09 PROCEDURE — 85025 COMPLETE CBC W/AUTO DIFF WBC: CPT | Mod: ORL

## 2021-12-09 PROCEDURE — 80053 COMPREHEN METABOLIC PANEL: CPT | Mod: ORL

## 2021-12-09 PROCEDURE — P9603 ONE-WAY ALLOW PRORATED MILES: HCPCS | Mod: ORL

## 2021-12-09 PROCEDURE — 36415 COLL VENOUS BLD VENIPUNCTURE: CPT | Mod: ORL

## 2021-12-15 ENCOUNTER — LAB REQUISITION (OUTPATIENT)
Dept: LAB | Facility: CLINIC | Age: 86
End: 2021-12-15
Payer: COMMERCIAL

## 2021-12-15 DIAGNOSIS — R05.2 SUBACUTE COUGH: ICD-10-CM

## 2021-12-16 LAB
BASOPHILS # BLD AUTO: 0.1 10E3/UL (ref 0–0.2)
BASOPHILS NFR BLD AUTO: 1 %
EOSINOPHIL # BLD AUTO: 0.2 10E3/UL (ref 0–0.7)
EOSINOPHIL NFR BLD AUTO: 3 %
ERYTHROCYTE [DISTWIDTH] IN BLOOD BY AUTOMATED COUNT: 17.2 % (ref 10–15)
HCT VFR BLD AUTO: 25.1 % (ref 35–47)
HGB BLD-MCNC: 7.7 G/DL (ref 11.7–15.7)
IMM GRANULOCYTES # BLD: 0 10E3/UL
IMM GRANULOCYTES NFR BLD: 0 %
LYMPHOCYTES # BLD AUTO: 1.4 10E3/UL (ref 0.8–5.3)
LYMPHOCYTES NFR BLD AUTO: 19 %
MCH RBC QN AUTO: 30 PG (ref 26.5–33)
MCHC RBC AUTO-ENTMCNC: 30.7 G/DL (ref 31.5–36.5)
MCV RBC AUTO: 98 FL (ref 78–100)
MONOCYTES # BLD AUTO: 1 10E3/UL (ref 0–1.3)
MONOCYTES NFR BLD AUTO: 13 %
NEUTROPHILS # BLD AUTO: 4.5 10E3/UL (ref 1.6–8.3)
NEUTROPHILS NFR BLD AUTO: 64 %
NRBC # BLD AUTO: 0 10E3/UL
NRBC BLD AUTO-RTO: 0 /100
PLATELET # BLD AUTO: 286 10E3/UL (ref 150–450)
RBC # BLD AUTO: 2.57 10E6/UL (ref 3.8–5.2)
WBC # BLD AUTO: 7.2 10E3/UL (ref 4–11)

## 2021-12-16 PROCEDURE — 85025 COMPLETE CBC W/AUTO DIFF WBC: CPT | Mod: ORL

## 2021-12-16 PROCEDURE — P9603 ONE-WAY ALLOW PRORATED MILES: HCPCS | Mod: ORL

## 2021-12-16 PROCEDURE — 36415 COLL VENOUS BLD VENIPUNCTURE: CPT | Mod: ORL

## 2021-12-18 ENCOUNTER — LAB REQUISITION (OUTPATIENT)
Dept: LAB | Facility: CLINIC | Age: 86
End: 2021-12-18
Payer: COMMERCIAL

## 2021-12-18 DIAGNOSIS — D64.89 OTHER SPECIFIED ANEMIAS: ICD-10-CM

## 2021-12-20 LAB
FERRITIN SERPL-MCNC: 36 NG/ML (ref 10–130)
IRON SERPL-MCNC: 18 UG/DL (ref 42–175)
RETICS # AUTO: 0.07 10E6/UL (ref 0.01–0.11)
RETICS/RBC NFR AUTO: 2.3 % (ref 0.8–2.7)

## 2021-12-20 PROCEDURE — 82728 ASSAY OF FERRITIN: CPT | Mod: ORL

## 2021-12-20 PROCEDURE — 85045 AUTOMATED RETICULOCYTE COUNT: CPT | Mod: ORL

## 2021-12-20 PROCEDURE — P9604 ONE-WAY ALLOW PRORATED TRIP: HCPCS | Mod: ORL

## 2021-12-20 PROCEDURE — 83540 ASSAY OF IRON: CPT | Mod: ORL

## 2021-12-20 PROCEDURE — 36415 COLL VENOUS BLD VENIPUNCTURE: CPT | Mod: ORL

## 2021-12-22 ENCOUNTER — LAB REQUISITION (OUTPATIENT)
Dept: LAB | Facility: CLINIC | Age: 86
End: 2021-12-22
Payer: COMMERCIAL

## 2021-12-22 DIAGNOSIS — D64.89 OTHER SPECIFIED ANEMIAS: ICD-10-CM

## 2021-12-22 DIAGNOSIS — J91.8 PLEURAL EFFUSION IN OTHER CONDITIONS CLASSIFIED ELSEWHERE: ICD-10-CM

## 2021-12-23 LAB
ANION GAP SERPL CALCULATED.3IONS-SCNC: 12 MMOL/L (ref 5–18)
BNP SERPL-MCNC: 504 PG/ML (ref 0–167)
BUN SERPL-MCNC: 19 MG/DL (ref 8–28)
CALCIUM SERPL-MCNC: 9.4 MG/DL (ref 8.5–10.5)
CHLORIDE BLD-SCNC: 103 MMOL/L (ref 98–107)
CO2 SERPL-SCNC: 26 MMOL/L (ref 22–31)
CREAT SERPL-MCNC: 0.89 MG/DL (ref 0.6–1.1)
FOLATE SERPL-MCNC: >20 NG/ML
GFR SERPL CREATININE-BSD FRML MDRD: 60 ML/MIN/1.73M2
GLUCOSE BLD-MCNC: 192 MG/DL (ref 70–125)
POTASSIUM BLD-SCNC: 4.8 MMOL/L (ref 3.5–5)
SODIUM SERPL-SCNC: 141 MMOL/L (ref 136–145)

## 2021-12-23 PROCEDURE — 36415 COLL VENOUS BLD VENIPUNCTURE: CPT | Mod: ORL

## 2021-12-23 PROCEDURE — 80048 BASIC METABOLIC PNL TOTAL CA: CPT | Mod: ORL

## 2021-12-23 PROCEDURE — 83880 ASSAY OF NATRIURETIC PEPTIDE: CPT | Mod: ORL

## 2021-12-23 PROCEDURE — 82746 ASSAY OF FOLIC ACID SERUM: CPT | Mod: ORL

## 2021-12-23 PROCEDURE — P9603 ONE-WAY ALLOW PRORATED MILES: HCPCS | Mod: ORL

## 2021-12-30 ENCOUNTER — LAB REQUISITION (OUTPATIENT)
Dept: LAB | Facility: CLINIC | Age: 86
End: 2021-12-30
Payer: COMMERCIAL

## 2021-12-30 DIAGNOSIS — D63.1 ANEMIA IN CHRONIC KIDNEY DISEASE (CODE): ICD-10-CM

## 2021-12-31 LAB
BASOPHILS # BLD AUTO: 0.1 10E3/UL (ref 0–0.2)
BASOPHILS NFR BLD AUTO: 1 %
EOSINOPHIL # BLD AUTO: 0.6 10E3/UL (ref 0–0.7)
EOSINOPHIL NFR BLD AUTO: 6 %
ERYTHROCYTE [DISTWIDTH] IN BLOOD BY AUTOMATED COUNT: 16 % (ref 10–15)
HCT VFR BLD AUTO: 24.4 % (ref 35–47)
HGB BLD-MCNC: 7.6 G/DL (ref 11.7–15.7)
IMM GRANULOCYTES # BLD: 0.1 10E3/UL
IMM GRANULOCYTES NFR BLD: 1 %
LYMPHOCYTES # BLD AUTO: 1.2 10E3/UL (ref 0.8–5.3)
LYMPHOCYTES NFR BLD AUTO: 13 %
MCH RBC QN AUTO: 29.7 PG (ref 26.5–33)
MCHC RBC AUTO-ENTMCNC: 31.1 G/DL (ref 31.5–36.5)
MCV RBC AUTO: 95 FL (ref 78–100)
MONOCYTES # BLD AUTO: 1.2 10E3/UL (ref 0–1.3)
MONOCYTES NFR BLD AUTO: 13 %
NEUTROPHILS # BLD AUTO: 6.3 10E3/UL (ref 1.6–8.3)
NEUTROPHILS NFR BLD AUTO: 66 %
NRBC # BLD AUTO: 0 10E3/UL
NRBC BLD AUTO-RTO: 0 /100
PLATELET # BLD AUTO: 263 10E3/UL (ref 150–450)
RBC # BLD AUTO: 2.56 10E6/UL (ref 3.8–5.2)
WBC # BLD AUTO: 9.5 10E3/UL (ref 4–11)

## 2021-12-31 PROCEDURE — P9603 ONE-WAY ALLOW PRORATED MILES: HCPCS | Mod: ORL

## 2021-12-31 PROCEDURE — 36415 COLL VENOUS BLD VENIPUNCTURE: CPT | Mod: ORL

## 2021-12-31 PROCEDURE — 85025 COMPLETE CBC W/AUTO DIFF WBC: CPT | Mod: ORL

## 2022-01-02 ENCOUNTER — LAB REQUISITION (OUTPATIENT)
Dept: LAB | Facility: CLINIC | Age: 87
End: 2022-01-02
Payer: COMMERCIAL

## 2022-01-02 DIAGNOSIS — I50.22 CHRONIC SYSTOLIC (CONGESTIVE) HEART FAILURE (H): ICD-10-CM

## 2022-01-02 DIAGNOSIS — D64.89 OTHER SPECIFIED ANEMIAS: ICD-10-CM

## 2022-01-04 LAB
ANION GAP SERPL CALCULATED.3IONS-SCNC: 12 MMOL/L (ref 5–18)
BUN SERPL-MCNC: 16 MG/DL (ref 8–28)
CALCIUM SERPL-MCNC: 9.4 MG/DL (ref 8.5–10.5)
CHLORIDE BLD-SCNC: 103 MMOL/L (ref 98–107)
CO2 SERPL-SCNC: 23 MMOL/L (ref 22–31)
CREAT SERPL-MCNC: 0.98 MG/DL (ref 0.6–1.1)
GFR SERPL CREATININE-BSD FRML MDRD: 53 ML/MIN/1.73M2
GLUCOSE BLD-MCNC: 155 MG/DL (ref 70–125)
HGB BLD-MCNC: 8.1 G/DL (ref 11.7–15.7)
POTASSIUM BLD-SCNC: 3.8 MMOL/L (ref 3.5–5)
SODIUM SERPL-SCNC: 138 MMOL/L (ref 136–145)

## 2022-01-04 PROCEDURE — 85018 HEMOGLOBIN: CPT | Mod: ORL

## 2022-01-04 PROCEDURE — P9603 ONE-WAY ALLOW PRORATED MILES: HCPCS | Mod: ORL

## 2022-01-04 PROCEDURE — 80048 BASIC METABOLIC PNL TOTAL CA: CPT | Mod: ORL

## 2022-01-04 PROCEDURE — 36415 COLL VENOUS BLD VENIPUNCTURE: CPT | Mod: ORL

## 2022-01-17 ENCOUNTER — LAB REQUISITION (OUTPATIENT)
Dept: LAB | Facility: CLINIC | Age: 87
End: 2022-01-17
Payer: COMMERCIAL

## 2022-01-17 DIAGNOSIS — D64.89 OTHER SPECIFIED ANEMIAS: ICD-10-CM

## 2022-01-18 LAB — HGB BLD-MCNC: 8.4 G/DL (ref 11.7–15.7)

## 2022-01-18 PROCEDURE — 36415 COLL VENOUS BLD VENIPUNCTURE: CPT | Mod: ORL

## 2022-01-18 PROCEDURE — P9604 ONE-WAY ALLOW PRORATED TRIP: HCPCS | Mod: ORL

## 2022-01-18 PROCEDURE — 85018 HEMOGLOBIN: CPT | Mod: ORL

## 2022-01-20 ENCOUNTER — LAB REQUISITION (OUTPATIENT)
Dept: LAB | Facility: CLINIC | Age: 87
End: 2022-01-20
Payer: COMMERCIAL

## 2022-01-20 DIAGNOSIS — R35.0 FREQUENCY OF MICTURITION: ICD-10-CM

## 2022-01-20 LAB
ALBUMIN UR-MCNC: 20 MG/DL
AMORPH CRY #/AREA URNS HPF: ABNORMAL /HPF
APPEARANCE UR: ABNORMAL
BACTERIA #/AREA URNS HPF: ABNORMAL /HPF
BILIRUB UR QL STRIP: NEGATIVE
COLOR UR AUTO: ABNORMAL
GLUCOSE UR STRIP-MCNC: NEGATIVE MG/DL
HGB UR QL STRIP: NEGATIVE
KETONES UR STRIP-MCNC: NEGATIVE MG/DL
LEUKOCYTE ESTERASE UR QL STRIP: ABNORMAL
NITRATE UR QL: NEGATIVE
PH UR STRIP: 5.5 [PH] (ref 5–7)
RBC URINE: 0 /HPF
SP GR UR STRIP: 1.02 (ref 1–1.03)
SQUAMOUS EPITHELIAL: 1 /HPF
UROBILINOGEN UR STRIP-MCNC: <2 MG/DL
WBC CLUMPS #/AREA URNS HPF: PRESENT /HPF
WBC URINE: 13 /HPF

## 2022-01-20 PROCEDURE — 81001 URINALYSIS AUTO W/SCOPE: CPT | Mod: ORL

## 2022-01-20 PROCEDURE — 87086 URINE CULTURE/COLONY COUNT: CPT | Mod: ORL

## 2022-01-21 LAB — BACTERIA UR CULT: ABNORMAL

## 2022-02-24 ENCOUNTER — LAB REQUISITION (OUTPATIENT)
Dept: LAB | Facility: CLINIC | Age: 87
End: 2022-02-24
Payer: COMMERCIAL

## 2022-02-24 DIAGNOSIS — D64.89 OTHER SPECIFIED ANEMIAS: ICD-10-CM

## 2022-02-25 LAB — HGB BLD-MCNC: 8.8 G/DL (ref 11.7–15.7)

## 2022-02-25 PROCEDURE — 36415 COLL VENOUS BLD VENIPUNCTURE: CPT | Mod: ORL

## 2022-02-25 PROCEDURE — 85018 HEMOGLOBIN: CPT | Mod: ORL

## 2022-02-25 PROCEDURE — P9603 ONE-WAY ALLOW PRORATED MILES: HCPCS | Mod: ORL

## 2022-03-30 ENCOUNTER — LAB REQUISITION (OUTPATIENT)
Dept: LAB | Facility: CLINIC | Age: 87
End: 2022-03-30
Payer: COMMERCIAL

## 2022-03-30 DIAGNOSIS — D64.89 OTHER SPECIFIED ANEMIAS: ICD-10-CM

## 2022-03-31 LAB
ANION GAP SERPL CALCULATED.3IONS-SCNC: 10 MMOL/L (ref 5–18)
BUN SERPL-MCNC: 17 MG/DL (ref 8–28)
CALCIUM SERPL-MCNC: 9.9 MG/DL (ref 8.5–10.5)
CHLORIDE BLD-SCNC: 104 MMOL/L (ref 98–107)
CO2 SERPL-SCNC: 28 MMOL/L (ref 22–31)
CREAT SERPL-MCNC: 0.73 MG/DL (ref 0.6–1.1)
GFR SERPL CREATININE-BSD FRML MDRD: 75 ML/MIN/1.73M2
GLUCOSE BLD-MCNC: 103 MG/DL (ref 70–125)
HGB BLD-MCNC: 9.7 G/DL (ref 11.7–15.7)
POTASSIUM BLD-SCNC: 4.2 MMOL/L (ref 3.5–5)
SODIUM SERPL-SCNC: 142 MMOL/L (ref 136–145)

## 2022-03-31 PROCEDURE — 80048 BASIC METABOLIC PNL TOTAL CA: CPT | Mod: ORL

## 2022-03-31 PROCEDURE — P9603 ONE-WAY ALLOW PRORATED MILES: HCPCS | Mod: ORL

## 2022-03-31 PROCEDURE — 85018 HEMOGLOBIN: CPT | Mod: ORL

## 2022-03-31 PROCEDURE — 36415 COLL VENOUS BLD VENIPUNCTURE: CPT | Mod: ORL

## 2022-04-19 ENCOUNTER — LAB REQUISITION (OUTPATIENT)
Dept: LAB | Facility: CLINIC | Age: 87
End: 2022-04-19
Payer: COMMERCIAL

## 2022-04-19 DIAGNOSIS — D64.89 OTHER SPECIFIED ANEMIAS: ICD-10-CM

## 2022-04-25 LAB — HGB BLD-MCNC: 9.9 G/DL (ref 11.7–15.7)

## 2022-04-25 PROCEDURE — 85018 HEMOGLOBIN: CPT | Mod: ORL

## 2022-04-25 PROCEDURE — 36415 COLL VENOUS BLD VENIPUNCTURE: CPT | Mod: ORL

## 2022-04-25 PROCEDURE — P9603 ONE-WAY ALLOW PRORATED MILES: HCPCS | Mod: ORL

## 2022-05-17 ENCOUNTER — LAB REQUISITION (OUTPATIENT)
Dept: LAB | Facility: CLINIC | Age: 87
End: 2022-05-17
Payer: COMMERCIAL

## 2022-05-17 DIAGNOSIS — D64.89 OTHER SPECIFIED ANEMIAS: ICD-10-CM

## 2022-05-18 PROCEDURE — 85018 HEMOGLOBIN: CPT | Mod: ORL

## 2022-05-18 PROCEDURE — P9603 ONE-WAY ALLOW PRORATED MILES: HCPCS | Mod: ORL

## 2022-05-18 PROCEDURE — 36415 COLL VENOUS BLD VENIPUNCTURE: CPT | Mod: ORL

## 2022-05-23 LAB — HGB BLD-MCNC: 10.2 G/DL (ref 11.7–15.7)

## 2022-06-13 ENCOUNTER — LAB REQUISITION (OUTPATIENT)
Dept: LAB | Facility: CLINIC | Age: 87
End: 2022-06-13
Payer: COMMERCIAL

## 2022-06-13 DIAGNOSIS — D64.89 OTHER SPECIFIED ANEMIAS: ICD-10-CM

## 2022-06-13 LAB — HGB BLD-MCNC: 9.4 G/DL (ref 11.7–15.7)

## 2022-06-13 PROCEDURE — 36415 COLL VENOUS BLD VENIPUNCTURE: CPT | Mod: ORL

## 2022-06-13 PROCEDURE — 85018 HEMOGLOBIN: CPT | Mod: ORL

## 2022-06-13 PROCEDURE — P9603 ONE-WAY ALLOW PRORATED MILES: HCPCS | Mod: ORL

## 2022-06-14 ENCOUNTER — LAB REQUISITION (OUTPATIENT)
Dept: LAB | Facility: CLINIC | Age: 87
End: 2022-06-14
Payer: COMMERCIAL

## 2022-06-14 DIAGNOSIS — D64.89 OTHER SPECIFIED ANEMIAS: ICD-10-CM

## 2022-06-20 LAB — HGB BLD-MCNC: 8 G/DL (ref 11.7–15.7)

## 2022-06-20 PROCEDURE — P9604 ONE-WAY ALLOW PRORATED TRIP: HCPCS | Mod: ORL

## 2022-06-20 PROCEDURE — 85018 HEMOGLOBIN: CPT | Mod: ORL

## 2022-06-20 PROCEDURE — 36415 COLL VENOUS BLD VENIPUNCTURE: CPT | Mod: ORL

## 2022-06-24 ENCOUNTER — LAB REQUISITION (OUTPATIENT)
Dept: LAB | Facility: CLINIC | Age: 87
End: 2022-06-24
Payer: COMMERCIAL

## 2022-06-24 DIAGNOSIS — D64.89 OTHER SPECIFIED ANEMIAS: ICD-10-CM

## 2022-06-30 ENCOUNTER — LAB REQUISITION (OUTPATIENT)
Dept: LAB | Facility: CLINIC | Age: 87
End: 2022-06-30
Payer: COMMERCIAL

## 2022-06-30 DIAGNOSIS — D64.89 OTHER SPECIFIED ANEMIAS: ICD-10-CM

## 2022-07-04 LAB
BASOPHILS # BLD AUTO: 0.1 10E3/UL (ref 0–0.2)
BASOPHILS NFR BLD AUTO: 1 %
EOSINOPHIL # BLD AUTO: 0.2 10E3/UL (ref 0–0.7)
EOSINOPHIL NFR BLD AUTO: 2 %
ERYTHROCYTE [DISTWIDTH] IN BLOOD BY AUTOMATED COUNT: 16.3 % (ref 10–15)
FERRITIN SERPL-MCNC: 139 NG/ML (ref 11–328)
FOLATE SERPL-MCNC: >40 NG/ML (ref 4.6–34.8)
HCT VFR BLD AUTO: 28.4 % (ref 35–47)
HGB BLD-MCNC: 8.7 G/DL (ref 11.7–15.7)
IMM GRANULOCYTES # BLD: 0.1 10E3/UL
IMM GRANULOCYTES NFR BLD: 1 %
LYMPHOCYTES # BLD AUTO: 1.5 10E3/UL (ref 0.8–5.3)
LYMPHOCYTES NFR BLD AUTO: 22 %
MCH RBC QN AUTO: 32.5 PG (ref 26.5–33)
MCHC RBC AUTO-ENTMCNC: 30.6 G/DL (ref 31.5–36.5)
MCV RBC AUTO: 106 FL (ref 78–100)
MONOCYTES # BLD AUTO: 0.7 10E3/UL (ref 0–1.3)
MONOCYTES NFR BLD AUTO: 10 %
NEUTROPHILS # BLD AUTO: 4.4 10E3/UL (ref 1.6–8.3)
NEUTROPHILS NFR BLD AUTO: 64 %
NRBC # BLD AUTO: 0 10E3/UL
NRBC BLD AUTO-RTO: 0 /100
PLATELET # BLD AUTO: 294 10E3/UL (ref 150–450)
RBC # BLD AUTO: 2.68 10E6/UL (ref 3.8–5.2)
RETIC HEMOGLOBIN: 31.1 PG (ref 28.2–35.7)
RETICS # AUTO: 0.09 10E6/UL (ref 0.03–0.1)
RETICS/RBC NFR AUTO: 3.4 % (ref 0.5–2)
VIT B12 SERPL-MCNC: 1272 PG/ML (ref 232–1245)
WBC # BLD AUTO: 6.9 10E3/UL (ref 4–11)

## 2022-07-04 PROCEDURE — 83550 IRON BINDING TEST: CPT | Mod: ORL

## 2022-07-04 PROCEDURE — 82728 ASSAY OF FERRITIN: CPT | Mod: ORL

## 2022-07-04 PROCEDURE — 82746 ASSAY OF FOLIC ACID SERUM: CPT | Mod: ORL

## 2022-07-04 PROCEDURE — 85025 COMPLETE CBC W/AUTO DIFF WBC: CPT | Mod: ORL

## 2022-07-04 PROCEDURE — 36415 COLL VENOUS BLD VENIPUNCTURE: CPT | Mod: ORL

## 2022-07-04 PROCEDURE — 85046 RETICYTE/HGB CONCENTRATE: CPT | Mod: ORL

## 2022-07-04 PROCEDURE — P9604 ONE-WAY ALLOW PRORATED TRIP: HCPCS | Mod: ORL

## 2022-07-04 PROCEDURE — 82607 VITAMIN B-12: CPT | Mod: ORL

## 2022-07-05 LAB
IRON SATN MFR SERPL: 29 % (ref 15–46)
IRON SERPL-MCNC: 96 UG/DL (ref 35–180)
TIBC SERPL-MCNC: 333 UG/DL (ref 240–430)

## 2022-07-12 ENCOUNTER — LAB REQUISITION (OUTPATIENT)
Dept: LAB | Facility: CLINIC | Age: 87
End: 2022-07-12
Payer: COMMERCIAL

## 2022-07-12 DIAGNOSIS — D64.89 OTHER SPECIFIED ANEMIAS: ICD-10-CM

## 2022-07-18 LAB — HGB BLD-MCNC: 9.3 G/DL (ref 11.7–15.7)

## 2022-07-18 PROCEDURE — 36415 COLL VENOUS BLD VENIPUNCTURE: CPT | Mod: ORL

## 2022-07-18 PROCEDURE — 85018 HEMOGLOBIN: CPT | Mod: ORL

## 2022-07-18 PROCEDURE — P9604 ONE-WAY ALLOW PRORATED TRIP: HCPCS | Mod: ORL

## 2022-08-11 ENCOUNTER — LAB REQUISITION (OUTPATIENT)
Dept: LAB | Facility: CLINIC | Age: 87
End: 2022-08-11
Payer: COMMERCIAL

## 2022-08-11 DIAGNOSIS — D64.89 OTHER SPECIFIED ANEMIAS: ICD-10-CM

## 2022-08-15 ENCOUNTER — LAB REQUISITION (OUTPATIENT)
Dept: LAB | Facility: CLINIC | Age: 87
End: 2022-08-15
Payer: COMMERCIAL

## 2022-08-15 DIAGNOSIS — D64.89 OTHER SPECIFIED ANEMIAS: ICD-10-CM

## 2022-08-15 LAB
ANION GAP SERPL CALCULATED.3IONS-SCNC: 9 MMOL/L (ref 7–15)
BUN SERPL-MCNC: 12.9 MG/DL (ref 8–23)
CALCIUM SERPL-MCNC: 8.9 MG/DL (ref 8.2–9.6)
CHLORIDE SERPL-SCNC: 109 MMOL/L (ref 98–107)
CREAT SERPL-MCNC: 0.64 MG/DL (ref 0.51–0.95)
DEPRECATED HCO3 PLAS-SCNC: 24 MMOL/L (ref 22–29)
GFR SERPL CREATININE-BSD FRML MDRD: 81 ML/MIN/1.73M2
GLUCOSE SERPL-MCNC: 79 MG/DL (ref 70–99)
HGB BLD-MCNC: 8.5 G/DL (ref 11.7–15.7)
POTASSIUM SERPL-SCNC: 4.2 MMOL/L (ref 3.4–5.3)
SODIUM SERPL-SCNC: 142 MMOL/L (ref 136–145)

## 2022-08-15 PROCEDURE — 36415 COLL VENOUS BLD VENIPUNCTURE: CPT | Mod: ORL

## 2022-08-15 PROCEDURE — P9604 ONE-WAY ALLOW PRORATED TRIP: HCPCS | Mod: ORL

## 2022-08-15 PROCEDURE — 80048 BASIC METABOLIC PNL TOTAL CA: CPT | Mod: ORL

## 2022-08-15 PROCEDURE — 85018 HEMOGLOBIN: CPT | Mod: ORL

## 2022-08-18 ENCOUNTER — LAB REQUISITION (OUTPATIENT)
Dept: LAB | Facility: CLINIC | Age: 87
End: 2022-08-18
Payer: COMMERCIAL

## 2022-08-18 DIAGNOSIS — D64.89 OTHER SPECIFIED ANEMIAS: ICD-10-CM

## 2022-08-22 LAB — HGB BLD-MCNC: 9.3 G/DL (ref 11.7–15.7)

## 2022-08-22 PROCEDURE — 36415 COLL VENOUS BLD VENIPUNCTURE: CPT | Mod: ORL

## 2022-08-22 PROCEDURE — 85018 HEMOGLOBIN: CPT | Mod: ORL

## 2022-08-22 PROCEDURE — P9603 ONE-WAY ALLOW PRORATED MILES: HCPCS | Mod: ORL

## 2022-09-13 ENCOUNTER — LAB REQUISITION (OUTPATIENT)
Dept: LAB | Facility: CLINIC | Age: 87
End: 2022-09-13
Payer: COMMERCIAL

## 2022-09-13 DIAGNOSIS — D64.89 OTHER SPECIFIED ANEMIAS: ICD-10-CM

## 2022-09-21 LAB — HGB BLD-MCNC: 9.1 G/DL (ref 11.7–15.7)

## 2022-09-21 PROCEDURE — P9603 ONE-WAY ALLOW PRORATED MILES: HCPCS | Mod: ORL

## 2022-09-21 PROCEDURE — 36415 COLL VENOUS BLD VENIPUNCTURE: CPT | Mod: ORL

## 2022-09-21 PROCEDURE — 85018 HEMOGLOBIN: CPT | Mod: ORL

## 2022-12-04 ENCOUNTER — LAB REQUISITION (OUTPATIENT)
Dept: LAB | Facility: CLINIC | Age: 87
End: 2022-12-04
Payer: COMMERCIAL

## 2022-12-04 DIAGNOSIS — D64.89 OTHER SPECIFIED ANEMIAS: ICD-10-CM

## 2022-12-05 LAB — HGB BLD-MCNC: 9.1 G/DL (ref 11.7–15.7)

## 2022-12-05 PROCEDURE — P9603 ONE-WAY ALLOW PRORATED MILES: HCPCS | Mod: ORL

## 2022-12-05 PROCEDURE — 85018 HEMOGLOBIN: CPT | Mod: ORL

## 2022-12-05 PROCEDURE — 36415 COLL VENOUS BLD VENIPUNCTURE: CPT | Mod: ORL

## 2023-02-10 ENCOUNTER — LAB REQUISITION (OUTPATIENT)
Dept: LAB | Facility: CLINIC | Age: 88
End: 2023-02-10
Payer: COMMERCIAL

## 2023-02-10 DIAGNOSIS — I10 ESSENTIAL (PRIMARY) HYPERTENSION: ICD-10-CM

## 2023-02-10 DIAGNOSIS — D64.89 OTHER SPECIFIED ANEMIAS: ICD-10-CM

## 2023-03-08 ENCOUNTER — MEDICAL CORRESPONDENCE (OUTPATIENT)
Dept: HEALTH INFORMATION MANAGEMENT | Facility: CLINIC | Age: 88
End: 2023-03-08
Payer: COMMERCIAL

## 2023-03-09 ENCOUNTER — LAB REQUISITION (OUTPATIENT)
Dept: LAB | Facility: CLINIC | Age: 88
End: 2023-03-09
Payer: COMMERCIAL

## 2023-03-09 DIAGNOSIS — E87.6 HYPOKALEMIA: ICD-10-CM

## 2023-03-09 DIAGNOSIS — D64.89 OTHER SPECIFIED ANEMIAS: ICD-10-CM

## 2023-03-13 LAB
ANION GAP SERPL CALCULATED.3IONS-SCNC: 12 MMOL/L (ref 7–15)
BUN SERPL-MCNC: 13.9 MG/DL (ref 8–23)
CALCIUM SERPL-MCNC: 8.7 MG/DL (ref 8.2–9.6)
CHLORIDE SERPL-SCNC: 107 MMOL/L (ref 98–107)
CREAT SERPL-MCNC: 0.73 MG/DL (ref 0.51–0.95)
DEPRECATED HCO3 PLAS-SCNC: 21 MMOL/L (ref 22–29)
GFR SERPL CREATININE-BSD FRML MDRD: 75 ML/MIN/1.73M2
GLUCOSE SERPL-MCNC: 147 MG/DL (ref 70–99)
HGB BLD-MCNC: 6.8 G/DL (ref 11.7–15.7)
POTASSIUM SERPL-SCNC: 4.3 MMOL/L (ref 3.4–5.3)
SODIUM SERPL-SCNC: 140 MMOL/L (ref 136–145)

## 2023-03-13 PROCEDURE — 36415 COLL VENOUS BLD VENIPUNCTURE: CPT | Mod: ORL

## 2023-03-13 PROCEDURE — 80048 BASIC METABOLIC PNL TOTAL CA: CPT | Mod: ORL

## 2023-03-13 PROCEDURE — P9603 ONE-WAY ALLOW PRORATED MILES: HCPCS | Mod: ORL

## 2023-03-13 PROCEDURE — 85018 HEMOGLOBIN: CPT | Mod: ORL

## 2023-03-23 ENCOUNTER — LAB REQUISITION (OUTPATIENT)
Dept: LAB | Facility: CLINIC | Age: 88
End: 2023-03-23
Payer: COMMERCIAL

## 2023-03-23 DIAGNOSIS — D64.89 OTHER SPECIFIED ANEMIAS: ICD-10-CM

## 2023-03-27 LAB
BASOPHILS # BLD AUTO: 0.2 10E3/UL (ref 0–0.2)
BASOPHILS NFR BLD AUTO: 2 %
EOSINOPHIL # BLD AUTO: 0.5 10E3/UL (ref 0–0.7)
EOSINOPHIL NFR BLD AUTO: 5 %
ERYTHROCYTE [DISTWIDTH] IN BLOOD BY AUTOMATED COUNT: 18.6 % (ref 10–15)
HCT VFR BLD AUTO: 32.6 % (ref 35–47)
HGB BLD-MCNC: 10 G/DL (ref 11.7–15.7)
IMM GRANULOCYTES # BLD: 0.2 10E3/UL
IMM GRANULOCYTES NFR BLD: 2 %
LYMPHOCYTES # BLD AUTO: 1.6 10E3/UL (ref 0.8–5.3)
LYMPHOCYTES NFR BLD AUTO: 15 %
MCH RBC QN AUTO: 32.4 PG (ref 26.5–33)
MCHC RBC AUTO-ENTMCNC: 30.7 G/DL (ref 31.5–36.5)
MCV RBC AUTO: 106 FL (ref 78–100)
MONOCYTES # BLD AUTO: 0.9 10E3/UL (ref 0–1.3)
MONOCYTES NFR BLD AUTO: 9 %
NEUTROPHILS # BLD AUTO: 6.7 10E3/UL (ref 1.6–8.3)
NEUTROPHILS NFR BLD AUTO: 67 %
NRBC # BLD AUTO: 0 10E3/UL
NRBC BLD AUTO-RTO: 0 /100
PLATELET # BLD AUTO: 307 10E3/UL (ref 150–450)
RBC # BLD AUTO: 3.09 10E6/UL (ref 3.8–5.2)
WBC # BLD AUTO: 10 10E3/UL (ref 4–11)

## 2023-03-27 PROCEDURE — P9603 ONE-WAY ALLOW PRORATED MILES: HCPCS | Mod: ORL

## 2023-03-27 PROCEDURE — 36415 COLL VENOUS BLD VENIPUNCTURE: CPT | Mod: ORL

## 2023-03-27 PROCEDURE — 85025 COMPLETE CBC W/AUTO DIFF WBC: CPT | Mod: ORL

## 2023-04-21 ENCOUNTER — LAB REQUISITION (OUTPATIENT)
Dept: LAB | Facility: CLINIC | Age: 88
End: 2023-04-21
Payer: COMMERCIAL

## 2023-04-21 DIAGNOSIS — K29.01 ACUTE GASTRITIS WITH BLEEDING: ICD-10-CM

## 2023-04-21 DIAGNOSIS — D64.89 OTHER SPECIFIED ANEMIAS: ICD-10-CM

## 2023-04-24 LAB — HGB BLD-MCNC: 9.3 G/DL (ref 11.7–15.7)

## 2023-04-24 PROCEDURE — 36415 COLL VENOUS BLD VENIPUNCTURE: CPT | Mod: ORL

## 2023-04-24 PROCEDURE — 85018 HEMOGLOBIN: CPT | Mod: ORL

## 2023-04-24 PROCEDURE — P9604 ONE-WAY ALLOW PRORATED TRIP: HCPCS | Mod: ORL

## 2023-05-07 ENCOUNTER — LAB REQUISITION (OUTPATIENT)
Dept: LAB | Facility: CLINIC | Age: 88
End: 2023-05-07
Payer: COMMERCIAL

## 2023-05-07 DIAGNOSIS — I10 ESSENTIAL (PRIMARY) HYPERTENSION: ICD-10-CM

## 2023-05-07 DIAGNOSIS — D64.89 OTHER SPECIFIED ANEMIAS: ICD-10-CM

## 2023-05-08 LAB
BASOPHILS # BLD AUTO: 0.1 10E3/UL (ref 0–0.2)
BASOPHILS NFR BLD AUTO: 0 %
EOSINOPHIL # BLD AUTO: 0 10E3/UL (ref 0–0.7)
EOSINOPHIL NFR BLD AUTO: 0 %
ERYTHROCYTE [DISTWIDTH] IN BLOOD BY AUTOMATED COUNT: 20.3 % (ref 10–15)
HCT VFR BLD AUTO: 32.2 % (ref 35–47)
HGB BLD-MCNC: 9.7 G/DL (ref 11.7–15.7)
IMM GRANULOCYTES # BLD: 0.3 10E3/UL
IMM GRANULOCYTES NFR BLD: 1 %
LYMPHOCYTES # BLD AUTO: 0.5 10E3/UL (ref 0.8–5.3)
LYMPHOCYTES NFR BLD AUTO: 3 %
MCH RBC QN AUTO: 32.4 PG (ref 26.5–33)
MCHC RBC AUTO-ENTMCNC: 30.1 G/DL (ref 31.5–36.5)
MCV RBC AUTO: 108 FL (ref 78–100)
MONOCYTES # BLD AUTO: 0.5 10E3/UL (ref 0–1.3)
MONOCYTES NFR BLD AUTO: 2 %
NEUTROPHILS # BLD AUTO: 18.7 10E3/UL (ref 1.6–8.3)
NEUTROPHILS NFR BLD AUTO: 94 %
NRBC # BLD AUTO: 0.1 10E3/UL
NRBC BLD AUTO-RTO: 0 /100
PLATELET # BLD AUTO: 290 10E3/UL (ref 150–450)
RBC # BLD AUTO: 2.99 10E6/UL (ref 3.8–5.2)
RETICS # AUTO: 0.06 10E6/UL (ref 0.03–0.1)
RETICS/RBC NFR AUTO: 2 % (ref 0.5–2)
WBC # BLD AUTO: 20 10E3/UL (ref 4–11)

## 2023-05-08 PROCEDURE — P9604 ONE-WAY ALLOW PRORATED TRIP: HCPCS | Mod: ORL

## 2023-05-08 PROCEDURE — 36415 COLL VENOUS BLD VENIPUNCTURE: CPT | Mod: ORL

## 2023-05-08 PROCEDURE — 85045 AUTOMATED RETICULOCYTE COUNT: CPT | Mod: ORL

## 2023-05-08 PROCEDURE — 85025 COMPLETE CBC W/AUTO DIFF WBC: CPT | Mod: ORL

## 2023-05-09 LAB
PATH REPORT.COMMENTS IMP SPEC: NORMAL
PATH REPORT.COMMENTS IMP SPEC: NORMAL
PATH REPORT.FINAL DX SPEC: NORMAL
PATH REPORT.RELEVANT HX SPEC: NORMAL

## 2023-05-09 PROCEDURE — 99207 BLOOD MORPHOLOGY PATHOLOGIST REVIEW: CPT | Performed by: PATHOLOGY

## 2023-05-13 ENCOUNTER — LAB REQUISITION (OUTPATIENT)
Dept: LAB | Facility: CLINIC | Age: 88
End: 2023-05-13
Payer: COMMERCIAL

## 2023-05-13 DIAGNOSIS — D72.828 OTHER ELEVATED WHITE BLOOD CELL COUNT: ICD-10-CM

## 2023-05-15 LAB
BASOPHILS # BLD AUTO: 0.1 10E3/UL (ref 0–0.2)
BASOPHILS NFR BLD AUTO: 1 %
EOSINOPHIL # BLD AUTO: 0.2 10E3/UL (ref 0–0.7)
EOSINOPHIL NFR BLD AUTO: 2 %
ERYTHROCYTE [DISTWIDTH] IN BLOOD BY AUTOMATED COUNT: 19.9 % (ref 10–15)
HCT VFR BLD AUTO: 30.8 % (ref 35–47)
HGB BLD-MCNC: 8.9 G/DL (ref 11.7–15.7)
IMM GRANULOCYTES # BLD: 0.1 10E3/UL
IMM GRANULOCYTES NFR BLD: 1 %
LYMPHOCYTES # BLD AUTO: 1.4 10E3/UL (ref 0.8–5.3)
LYMPHOCYTES NFR BLD AUTO: 14 %
MCH RBC QN AUTO: 32.7 PG (ref 26.5–33)
MCHC RBC AUTO-ENTMCNC: 28.9 G/DL (ref 31.5–36.5)
MCV RBC AUTO: 113 FL (ref 78–100)
MONOCYTES # BLD AUTO: 0.8 10E3/UL (ref 0–1.3)
MONOCYTES NFR BLD AUTO: 8 %
NEUTROPHILS # BLD AUTO: 7.4 10E3/UL (ref 1.6–8.3)
NEUTROPHILS NFR BLD AUTO: 74 %
NRBC # BLD AUTO: 0 10E3/UL
NRBC BLD AUTO-RTO: 0 /100
PLATELET # BLD AUTO: 181 10E3/UL (ref 150–450)
RBC # BLD AUTO: 2.72 10E6/UL (ref 3.8–5.2)
WBC # BLD AUTO: 10 10E3/UL (ref 4–11)

## 2023-05-15 PROCEDURE — P9603 ONE-WAY ALLOW PRORATED MILES: HCPCS | Mod: ORL

## 2023-05-15 PROCEDURE — 36415 COLL VENOUS BLD VENIPUNCTURE: CPT | Mod: ORL

## 2023-05-15 PROCEDURE — 85025 COMPLETE CBC W/AUTO DIFF WBC: CPT | Mod: ORL

## 2023-06-09 ENCOUNTER — LAB REQUISITION (OUTPATIENT)
Dept: LAB | Facility: CLINIC | Age: 88
End: 2023-06-09
Payer: COMMERCIAL

## 2023-06-09 DIAGNOSIS — J44.9 CHRONIC OBSTRUCTIVE PULMONARY DISEASE, UNSPECIFIED (H): ICD-10-CM

## 2023-06-12 LAB — HGB BLD-MCNC: 8.5 G/DL (ref 11.7–15.7)

## 2023-06-12 PROCEDURE — 85018 HEMOGLOBIN: CPT | Mod: ORL

## 2023-06-12 PROCEDURE — 36415 COLL VENOUS BLD VENIPUNCTURE: CPT | Mod: ORL

## 2023-06-12 PROCEDURE — P9604 ONE-WAY ALLOW PRORATED TRIP: HCPCS | Mod: ORL

## 2023-07-14 ENCOUNTER — LAB REQUISITION (OUTPATIENT)
Dept: LAB | Facility: CLINIC | Age: 88
End: 2023-07-14
Payer: COMMERCIAL

## 2023-07-14 DIAGNOSIS — D64.89 OTHER SPECIFIED ANEMIAS: ICD-10-CM

## 2023-07-17 LAB — HGB BLD-MCNC: 8.3 G/DL (ref 11.7–15.7)

## 2023-07-17 PROCEDURE — 36415 COLL VENOUS BLD VENIPUNCTURE: CPT | Mod: ORL

## 2023-07-17 PROCEDURE — P9603 ONE-WAY ALLOW PRORATED MILES: HCPCS | Mod: ORL

## 2023-07-17 PROCEDURE — 85018 HEMOGLOBIN: CPT | Mod: ORL

## 2023-08-12 ENCOUNTER — LAB REQUISITION (OUTPATIENT)
Dept: LAB | Facility: CLINIC | Age: 88
End: 2023-08-12
Payer: COMMERCIAL

## 2023-08-12 DIAGNOSIS — D64.89 OTHER SPECIFIED ANEMIAS: ICD-10-CM

## 2023-08-14 LAB — HGB BLD-MCNC: 9.3 G/DL (ref 11.7–15.7)

## 2023-08-14 PROCEDURE — 85018 HEMOGLOBIN: CPT | Mod: ORL

## 2023-08-14 PROCEDURE — P9604 ONE-WAY ALLOW PRORATED TRIP: HCPCS | Mod: ORL

## 2023-08-14 PROCEDURE — 36415 COLL VENOUS BLD VENIPUNCTURE: CPT | Mod: ORL

## 2023-09-01 ENCOUNTER — LAB REQUISITION (OUTPATIENT)
Dept: LAB | Facility: CLINIC | Age: 88
End: 2023-09-01
Payer: COMMERCIAL

## 2023-09-01 DIAGNOSIS — G89.29 OTHER CHRONIC PAIN: ICD-10-CM

## 2023-09-07 LAB — ERYTHROCYTE [SEDIMENTATION RATE] IN BLOOD BY WESTERGREN METHOD: 14 MM/HR (ref 0–30)

## 2023-09-07 PROCEDURE — 85652 RBC SED RATE AUTOMATED: CPT | Mod: ORL

## 2023-09-07 PROCEDURE — P9603 ONE-WAY ALLOW PRORATED MILES: HCPCS | Mod: ORL

## 2023-09-07 PROCEDURE — 36415 COLL VENOUS BLD VENIPUNCTURE: CPT | Mod: ORL

## 2023-09-23 ENCOUNTER — LAB REQUISITION (OUTPATIENT)
Dept: LAB | Facility: CLINIC | Age: 88
End: 2023-09-23
Payer: COMMERCIAL

## 2023-09-23 DIAGNOSIS — I50.22 CHRONIC SYSTOLIC (CONGESTIVE) HEART FAILURE (H): ICD-10-CM

## 2023-09-25 LAB
ANION GAP SERPL CALCULATED.3IONS-SCNC: 14 MMOL/L (ref 7–15)
BASOPHILS # BLD AUTO: 0.2 10E3/UL (ref 0–0.2)
BASOPHILS NFR BLD AUTO: 2 %
BUN SERPL-MCNC: 17.7 MG/DL (ref 8–23)
CALCIUM SERPL-MCNC: 9.1 MG/DL (ref 8.2–9.6)
CHLORIDE SERPL-SCNC: 106 MMOL/L (ref 98–107)
CREAT SERPL-MCNC: 0.73 MG/DL (ref 0.51–0.95)
DEPRECATED HCO3 PLAS-SCNC: 22 MMOL/L (ref 22–29)
EGFRCR SERPLBLD CKD-EPI 2021: 75 ML/MIN/1.73M2
EOSINOPHIL # BLD AUTO: 0.2 10E3/UL (ref 0–0.7)
EOSINOPHIL NFR BLD AUTO: 3 %
ERYTHROCYTE [DISTWIDTH] IN BLOOD BY AUTOMATED COUNT: 16.5 % (ref 10–15)
GLUCOSE SERPL-MCNC: 131 MG/DL (ref 70–99)
HCT VFR BLD AUTO: 29.3 % (ref 35–47)
HGB BLD-MCNC: 9 G/DL (ref 11.7–15.7)
IMM GRANULOCYTES # BLD: 0.1 10E3/UL
IMM GRANULOCYTES NFR BLD: 1 %
LYMPHOCYTES # BLD AUTO: 1.5 10E3/UL (ref 0.8–5.3)
LYMPHOCYTES NFR BLD AUTO: 17 %
MCH RBC QN AUTO: 33.7 PG (ref 26.5–33)
MCHC RBC AUTO-ENTMCNC: 30.7 G/DL (ref 31.5–36.5)
MCV RBC AUTO: 110 FL (ref 78–100)
MONOCYTES # BLD AUTO: 0.8 10E3/UL (ref 0–1.3)
MONOCYTES NFR BLD AUTO: 9 %
NEUTROPHILS # BLD AUTO: 5.9 10E3/UL (ref 1.6–8.3)
NEUTROPHILS NFR BLD AUTO: 68 %
NRBC # BLD AUTO: 0.1 10E3/UL
NRBC BLD AUTO-RTO: 1 /100
PLATELET # BLD AUTO: 234 10E3/UL (ref 150–450)
POTASSIUM SERPL-SCNC: 4.2 MMOL/L (ref 3.4–5.3)
RBC # BLD AUTO: 2.67 10E6/UL (ref 3.8–5.2)
SODIUM SERPL-SCNC: 142 MMOL/L (ref 136–145)
WBC # BLD AUTO: 8.5 10E3/UL (ref 4–11)

## 2023-09-25 PROCEDURE — 36415 COLL VENOUS BLD VENIPUNCTURE: CPT | Mod: ORL | Performed by: REGISTERED NURSE

## 2023-09-25 PROCEDURE — 80048 BASIC METABOLIC PNL TOTAL CA: CPT | Mod: ORL | Performed by: REGISTERED NURSE

## 2023-09-25 PROCEDURE — 85025 COMPLETE CBC W/AUTO DIFF WBC: CPT | Mod: ORL | Performed by: REGISTERED NURSE

## 2023-09-25 PROCEDURE — P9604 ONE-WAY ALLOW PRORATED TRIP: HCPCS | Mod: ORL | Performed by: REGISTERED NURSE

## 2023-12-27 ENCOUNTER — LAB REQUISITION (OUTPATIENT)
Dept: LAB | Facility: CLINIC | Age: 88
End: 2023-12-27
Payer: COMMERCIAL

## 2023-12-27 DIAGNOSIS — D51.3 OTHER DIETARY VITAMIN B12 DEFICIENCY ANEMIA: ICD-10-CM

## 2023-12-28 LAB
HGB BLD-MCNC: 8.7 G/DL (ref 11.7–15.7)
VIT B12 SERPL-MCNC: 583 PG/ML (ref 232–1245)

## 2023-12-28 PROCEDURE — 85018 HEMOGLOBIN: CPT | Mod: ORL

## 2023-12-28 PROCEDURE — 36415 COLL VENOUS BLD VENIPUNCTURE: CPT | Mod: ORL

## 2023-12-28 PROCEDURE — 82607 VITAMIN B-12: CPT | Mod: ORL

## 2023-12-28 PROCEDURE — P9603 ONE-WAY ALLOW PRORATED MILES: HCPCS | Mod: ORL

## 2024-01-12 ENCOUNTER — LAB REQUISITION (OUTPATIENT)
Dept: LAB | Facility: CLINIC | Age: 89
End: 2024-01-12
Payer: COMMERCIAL

## 2024-01-12 DIAGNOSIS — U07.1 COVID-19: ICD-10-CM

## 2024-01-15 LAB
ANION GAP SERPL CALCULATED.3IONS-SCNC: 14 MMOL/L (ref 7–15)
BUN SERPL-MCNC: 25.1 MG/DL (ref 8–23)
CALCIUM SERPL-MCNC: 9.4 MG/DL (ref 8.2–9.6)
CHLORIDE SERPL-SCNC: 106 MMOL/L (ref 98–107)
CREAT SERPL-MCNC: 0.77 MG/DL (ref 0.51–0.95)
DEPRECATED HCO3 PLAS-SCNC: 22 MMOL/L (ref 22–29)
EGFRCR SERPLBLD CKD-EPI 2021: 70 ML/MIN/1.73M2
ERYTHROCYTE [DISTWIDTH] IN BLOOD BY AUTOMATED COUNT: 17.5 % (ref 10–15)
GLUCOSE SERPL-MCNC: 73 MG/DL (ref 70–99)
HCT VFR BLD AUTO: 26.2 % (ref 35–47)
HGB BLD-MCNC: 7.9 G/DL (ref 11.7–15.7)
MCH RBC QN AUTO: 33.2 PG (ref 26.5–33)
MCHC RBC AUTO-ENTMCNC: 30.2 G/DL (ref 31.5–36.5)
MCV RBC AUTO: 110 FL (ref 78–100)
PLATELET # BLD AUTO: 314 10E3/UL (ref 150–450)
POTASSIUM SERPL-SCNC: 4.2 MMOL/L (ref 3.4–5.3)
RBC # BLD AUTO: 2.38 10E6/UL (ref 3.8–5.2)
SODIUM SERPL-SCNC: 142 MMOL/L (ref 135–145)
WBC # BLD AUTO: 10.8 10E3/UL (ref 4–11)

## 2024-01-15 PROCEDURE — 36415 COLL VENOUS BLD VENIPUNCTURE: CPT | Mod: ORL | Performed by: NURSE PRACTITIONER

## 2024-01-15 PROCEDURE — P9604 ONE-WAY ALLOW PRORATED TRIP: HCPCS | Mod: ORL | Performed by: NURSE PRACTITIONER

## 2024-01-15 PROCEDURE — 85027 COMPLETE CBC AUTOMATED: CPT | Mod: ORL | Performed by: NURSE PRACTITIONER

## 2024-01-15 PROCEDURE — 80048 BASIC METABOLIC PNL TOTAL CA: CPT | Mod: ORL | Performed by: NURSE PRACTITIONER

## 2024-05-10 ENCOUNTER — LAB REQUISITION (OUTPATIENT)
Dept: LAB | Facility: CLINIC | Age: 89
End: 2024-05-10
Payer: COMMERCIAL

## 2024-05-10 DIAGNOSIS — D64.89 OTHER SPECIFIED ANEMIAS: ICD-10-CM

## 2024-05-13 LAB
ERYTHROCYTE [DISTWIDTH] IN BLOOD BY AUTOMATED COUNT: 17.1 % (ref 10–15)
HCT VFR BLD AUTO: 28.5 % (ref 35–47)
HGB BLD-MCNC: 8.5 G/DL (ref 11.7–15.7)
IRON BINDING CAPACITY (ROCHE): 227 UG/DL (ref 240–430)
IRON SATN MFR SERPL: 34 % (ref 15–46)
IRON SERPL-MCNC: 77 UG/DL (ref 37–145)
MCH RBC QN AUTO: 32.9 PG (ref 26.5–33)
MCHC RBC AUTO-ENTMCNC: 29.8 G/DL (ref 31.5–36.5)
MCV RBC AUTO: 111 FL (ref 78–100)
PLATELET # BLD AUTO: 299 10E3/UL (ref 150–450)
RBC # BLD AUTO: 2.58 10E6/UL (ref 3.8–5.2)
WBC # BLD AUTO: 9.9 10E3/UL (ref 4–11)

## 2024-05-13 PROCEDURE — 36415 COLL VENOUS BLD VENIPUNCTURE: CPT | Mod: ORL

## 2024-05-13 PROCEDURE — 85027 COMPLETE CBC AUTOMATED: CPT | Mod: ORL

## 2024-05-13 PROCEDURE — P9604 ONE-WAY ALLOW PRORATED TRIP: HCPCS | Mod: ORL

## 2024-05-13 PROCEDURE — 83550 IRON BINDING TEST: CPT | Mod: ORL

## 2024-08-27 ENCOUNTER — LAB REQUISITION (OUTPATIENT)
Dept: LAB | Facility: CLINIC | Age: 89
End: 2024-08-27
Payer: COMMERCIAL

## 2024-08-27 DIAGNOSIS — R39.89 OTHER SYMPTOMS AND SIGNS INVOLVING THE GENITOURINARY SYSTEM: ICD-10-CM

## 2024-08-27 LAB
ALBUMIN UR-MCNC: NEGATIVE MG/DL
APPEARANCE UR: CLEAR
BILIRUB UR QL STRIP: NEGATIVE
COLOR UR AUTO: NORMAL
GLUCOSE UR STRIP-MCNC: NEGATIVE MG/DL
HGB UR QL STRIP: NEGATIVE
KETONES UR STRIP-MCNC: NEGATIVE MG/DL
LEUKOCYTE ESTERASE UR QL STRIP: NEGATIVE
NITRATE UR QL: NEGATIVE
PH UR STRIP: 5.5 [PH] (ref 5–7)
SP GR UR STRIP: 1.01 (ref 1–1.03)
UROBILINOGEN UR STRIP-MCNC: NORMAL MG/DL

## 2024-08-27 PROCEDURE — 81003 URINALYSIS AUTO W/O SCOPE: CPT | Mod: ORL

## 2024-08-27 PROCEDURE — 87086 URINE CULTURE/COLONY COUNT: CPT | Mod: ORL

## 2024-08-29 LAB — BACTERIA UR CULT: NORMAL

## 2024-09-11 ENCOUNTER — LAB REQUISITION (OUTPATIENT)
Dept: LAB | Facility: CLINIC | Age: 89
End: 2024-09-11
Payer: COMMERCIAL

## 2024-09-11 LAB
ALBUMIN UR-MCNC: 10 MG/DL
APPEARANCE UR: CLEAR
BACTERIA #/AREA URNS HPF: ABNORMAL /HPF
BILIRUB UR QL STRIP: NEGATIVE
COLOR UR AUTO: YELLOW
GLUCOSE UR STRIP-MCNC: NEGATIVE MG/DL
HGB UR QL STRIP: NEGATIVE
HYALINE CASTS: 1 /LPF
KETONES UR STRIP-MCNC: NEGATIVE MG/DL
LEUKOCYTE ESTERASE UR QL STRIP: ABNORMAL
MUCOUS THREADS #/AREA URNS LPF: PRESENT /LPF
NITRATE UR QL: NEGATIVE
PH UR STRIP: 5.5 [PH] (ref 5–7)
RBC URINE: <1 /HPF
SP GR UR STRIP: 1.02 (ref 1–1.03)
SQUAMOUS EPITHELIAL: 1 /HPF
TRANSITIONAL EPI: <1 /HPF
UROBILINOGEN UR STRIP-MCNC: NORMAL MG/DL
WBC URINE: 6 /HPF

## 2024-09-11 PROCEDURE — 81001 URINALYSIS AUTO W/SCOPE: CPT | Mod: ORL

## 2024-09-11 PROCEDURE — 87086 URINE CULTURE/COLONY COUNT: CPT | Mod: ORL

## 2024-09-12 LAB — BACTERIA UR CULT: NORMAL

## 2024-10-16 ENCOUNTER — LAB REQUISITION (OUTPATIENT)
Dept: LAB | Facility: CLINIC | Age: 89
End: 2024-10-16
Payer: COMMERCIAL

## 2024-10-16 DIAGNOSIS — R63.4 ABNORMAL WEIGHT LOSS: ICD-10-CM

## 2024-10-16 DIAGNOSIS — K59.1 FUNCTIONAL DIARRHEA: ICD-10-CM

## 2024-10-16 DIAGNOSIS — R39.89 OTHER SYMPTOMS AND SIGNS INVOLVING THE GENITOURINARY SYSTEM: ICD-10-CM

## 2024-10-18 ENCOUNTER — LAB REQUISITION (OUTPATIENT)
Dept: LAB | Facility: CLINIC | Age: 89
End: 2024-10-18
Payer: COMMERCIAL

## 2024-10-18 DIAGNOSIS — K59.1 FUNCTIONAL DIARRHEA: ICD-10-CM

## 2024-10-18 DIAGNOSIS — R63.4 ABNORMAL WEIGHT LOSS: ICD-10-CM

## 2024-10-18 LAB
ALBUMIN UR-MCNC: 10 MG/DL
APPEARANCE UR: CLEAR
BILIRUB UR QL STRIP: NEGATIVE
COLOR UR AUTO: YELLOW
GLUCOSE UR STRIP-MCNC: NEGATIVE MG/DL
HGB UR QL STRIP: NEGATIVE
KETONES UR STRIP-MCNC: NEGATIVE MG/DL
LEUKOCYTE ESTERASE UR QL STRIP: NEGATIVE
MUCOUS THREADS #/AREA URNS LPF: PRESENT /LPF
NITRATE UR QL: NEGATIVE
PH UR STRIP: 5.5 [PH] (ref 5–7)
RBC URINE: <1 /HPF
SP GR UR STRIP: 1.03 (ref 1–1.03)
SQUAMOUS EPITHELIAL: <1 /HPF
UROBILINOGEN UR STRIP-MCNC: NORMAL MG/DL
WBC URINE: 1 /HPF

## 2024-10-18 PROCEDURE — 81001 URINALYSIS AUTO W/SCOPE: CPT | Mod: ORL

## 2024-10-18 PROCEDURE — 87209 SMEAR COMPLEX STAIN: CPT | Mod: ORL

## 2024-10-21 LAB
O+P STL MICRO: NEGATIVE
SPECIMEN TYPE: NORMAL